# Patient Record
Sex: FEMALE | Race: OTHER | Employment: UNEMPLOYED | URBAN - METROPOLITAN AREA
[De-identification: names, ages, dates, MRNs, and addresses within clinical notes are randomized per-mention and may not be internally consistent; named-entity substitution may affect disease eponyms.]

---

## 2017-05-11 ENCOUNTER — HOSPITAL ENCOUNTER (EMERGENCY)
Age: 42
Discharge: HOME | End: 2017-05-11
Payer: MEDICARE

## 2017-05-11 VITALS
TEMPERATURE: 98.9 F | WEIGHT: 156 LBS | BODY MASS INDEX: 25.99 KG/M2 | DIASTOLIC BLOOD PRESSURE: 79 MMHG | SYSTOLIC BLOOD PRESSURE: 120 MMHG | RESPIRATION RATE: 20 BRPM | HEIGHT: 65 IN | HEART RATE: 107 BPM | OXYGEN SATURATION: 97 %

## 2017-05-11 DIAGNOSIS — M25.50 ARTHRALGIA, UNSPECIFIED JOINT: ICD-10-CM

## 2017-05-11 DIAGNOSIS — K04.7 DENTAL INFECTION: Primary | ICD-10-CM

## 2017-05-11 PROCEDURE — 6370000000 HC RX 637 (ALT 250 FOR IP): Performed by: PHYSICIAN ASSISTANT

## 2017-05-11 PROCEDURE — 6360000002 HC RX W HCPCS: Performed by: PHYSICIAN ASSISTANT

## 2017-05-11 PROCEDURE — 96372 THER/PROPH/DIAG INJ SC/IM: CPT

## 2017-05-11 PROCEDURE — 99282 EMERGENCY DEPT VISIT SF MDM: CPT

## 2017-05-11 RX ORDER — OXYCODONE HYDROCHLORIDE AND ACETAMINOPHEN 5; 325 MG/1; MG/1
1 TABLET ORAL EVERY 4 HOURS PRN
Qty: 5 TABLET | Refills: 0 | Status: SHIPPED | OUTPATIENT
Start: 2017-05-11 | End: 2017-05-18

## 2017-05-11 RX ORDER — CLINDAMYCIN HYDROCHLORIDE 150 MG/1
300 CAPSULE ORAL 4 TIMES DAILY
Qty: 28 CAPSULE | Refills: 0 | Status: SHIPPED | OUTPATIENT
Start: 2017-05-11 | End: 2017-05-18

## 2017-05-11 RX ORDER — OXYCODONE HYDROCHLORIDE AND ACETAMINOPHEN 5; 325 MG/1; MG/1
1 TABLET ORAL ONCE
Status: COMPLETED | OUTPATIENT
Start: 2017-05-11 | End: 2017-05-11

## 2017-05-11 RX ORDER — NAPROXEN 375 MG/1
TABLET ORAL
Qty: 20 TABLET | Refills: 0 | Status: SHIPPED | OUTPATIENT
Start: 2017-05-11 | End: 2019-07-31

## 2017-05-11 RX ORDER — KETOROLAC TROMETHAMINE 30 MG/ML
60 INJECTION, SOLUTION INTRAMUSCULAR; INTRAVENOUS ONCE
Status: COMPLETED | OUTPATIENT
Start: 2017-05-11 | End: 2017-05-11

## 2017-05-11 RX ORDER — CLINDAMYCIN HYDROCHLORIDE 150 MG/1
300 CAPSULE ORAL ONCE
Status: COMPLETED | OUTPATIENT
Start: 2017-05-11 | End: 2017-05-11

## 2017-05-11 RX ADMIN — OXYCODONE HYDROCHLORIDE AND ACETAMINOPHEN 1 TABLET: 5; 325 TABLET ORAL at 23:02

## 2017-05-11 RX ADMIN — KETOROLAC TROMETHAMINE 60 MG: 60 INJECTION, SOLUTION INTRAMUSCULAR at 23:02

## 2017-05-11 RX ADMIN — CLINDAMYCIN HYDROCHLORIDE 300 MG: 150 CAPSULE ORAL at 23:02

## 2017-05-11 ASSESSMENT — ENCOUNTER SYMPTOMS: RESPIRATORY NEGATIVE: 1

## 2017-05-11 ASSESSMENT — PAIN SCALES - GENERAL
PAINLEVEL_OUTOF10: 10
PAINLEVEL_OUTOF10: 10

## 2017-07-24 ENCOUNTER — HOSPITAL ENCOUNTER (EMERGENCY)
Age: 42
Discharge: HOME | End: 2017-07-24
Attending: EMERGENCY MEDICINE
Payer: MEDICARE

## 2017-07-24 VITALS
OXYGEN SATURATION: 98 % | SYSTOLIC BLOOD PRESSURE: 114 MMHG | TEMPERATURE: 98.4 F | DIASTOLIC BLOOD PRESSURE: 63 MMHG | HEART RATE: 67 BPM | RESPIRATION RATE: 16 BRPM

## 2017-07-24 DIAGNOSIS — R10.9 ABDOMINAL PAIN, UNSPECIFIED LOCATION: Primary | ICD-10-CM

## 2017-07-24 DIAGNOSIS — K52.9 GASTROENTERITIS: ICD-10-CM

## 2017-07-24 LAB
ACETAMINOPHEN LEVEL: <15 UG/ML (ref 10–30)
ALBUMIN SERPL-MCNC: 4.2 G/DL (ref 3.9–4.9)
ALP BLD-CCNC: 82 U/L (ref 40–130)
ALT SERPL-CCNC: 7 U/L (ref 0–33)
ANION GAP SERPL CALCULATED.3IONS-SCNC: 15 MEQ/L (ref 7–13)
AST SERPL-CCNC: 15 U/L (ref 0–35)
BASOPHILS ABSOLUTE: 0.1 K/UL (ref 0–0.2)
BASOPHILS RELATIVE PERCENT: 0.4 %
BILIRUB SERPL-MCNC: 0.4 MG/DL (ref 0–1.2)
BUN BLDV-MCNC: 8 MG/DL (ref 6–20)
CALCIUM SERPL-MCNC: 9.7 MG/DL (ref 8.6–10.2)
CHLORIDE BLD-SCNC: 98 MEQ/L (ref 98–107)
CO2: 23 MEQ/L (ref 22–29)
CREAT SERPL-MCNC: 0.58 MG/DL (ref 0.5–0.9)
EOSINOPHILS ABSOLUTE: 0.1 K/UL (ref 0–0.7)
EOSINOPHILS RELATIVE PERCENT: 0.7 %
ETHANOL PERCENT: NORMAL G/DL
ETHANOL: <10 MG/DL (ref 0–0.08)
GFR AFRICAN AMERICAN: >60
GFR NON-AFRICAN AMERICAN: >60
GLOBULIN: 4.8 G/DL (ref 2.3–3.5)
GLUCOSE BLD-MCNC: 108 MG/DL (ref 74–109)
HCG QUALITATIVE: NEGATIVE
HCT VFR BLD CALC: 43.9 % (ref 37–47)
HEMOGLOBIN: 14.3 G/DL (ref 12–16)
LYMPHOCYTES ABSOLUTE: 2.3 K/UL (ref 1–4.8)
LYMPHOCYTES RELATIVE PERCENT: 15.1 %
MCH RBC QN AUTO: 26 PG (ref 27–31.3)
MCHC RBC AUTO-ENTMCNC: 32.5 % (ref 33–37)
MCV RBC AUTO: 79.9 FL (ref 82–100)
MONOCYTES ABSOLUTE: 0.8 K/UL (ref 0.2–0.8)
MONOCYTES RELATIVE PERCENT: 5.1 %
NEUTROPHILS ABSOLUTE: 11.8 K/UL (ref 1.4–6.5)
NEUTROPHILS RELATIVE PERCENT: 78.7 %
PDW BLD-RTO: 15.5 % (ref 11.5–14.5)
PLATELET # BLD: 346 K/UL (ref 130–400)
POTASSIUM SERPL-SCNC: 3.2 MEQ/L (ref 3.5–5.1)
RBC # BLD: 5.5 M/UL (ref 4.2–5.4)
SALICYLATE, SERUM: <0.3 MG/DL (ref 15–30)
SODIUM BLD-SCNC: 136 MEQ/L (ref 132–144)
TOTAL PROTEIN: 9 G/DL (ref 6.4–8.1)
WBC # BLD: 15 K/UL (ref 4.8–10.8)

## 2017-07-24 PROCEDURE — 6360000002 HC RX W HCPCS: Performed by: EMERGENCY MEDICINE

## 2017-07-24 PROCEDURE — 80053 COMPREHEN METABOLIC PANEL: CPT

## 2017-07-24 PROCEDURE — 2500000003 HC RX 250 WO HCPCS: Performed by: EMERGENCY MEDICINE

## 2017-07-24 PROCEDURE — S0028 INJECTION, FAMOTIDINE, 20 MG: HCPCS | Performed by: EMERGENCY MEDICINE

## 2017-07-24 PROCEDURE — 85025 COMPLETE CBC W/AUTO DIFF WBC: CPT

## 2017-07-24 PROCEDURE — G0480 DRUG TEST DEF 1-7 CLASSES: HCPCS

## 2017-07-24 PROCEDURE — 36415 COLL VENOUS BLD VENIPUNCTURE: CPT

## 2017-07-24 PROCEDURE — 99284 EMERGENCY DEPT VISIT MOD MDM: CPT

## 2017-07-24 PROCEDURE — 84703 CHORIONIC GONADOTROPIN ASSAY: CPT

## 2017-07-24 PROCEDURE — 2580000003 HC RX 258: Performed by: EMERGENCY MEDICINE

## 2017-07-24 PROCEDURE — 96374 THER/PROPH/DIAG INJ IV PUSH: CPT

## 2017-07-24 PROCEDURE — 96375 TX/PRO/DX INJ NEW DRUG ADDON: CPT

## 2017-07-24 RX ORDER — DIPHENHYDRAMINE HYDROCHLORIDE 50 MG/ML
25 INJECTION INTRAMUSCULAR; INTRAVENOUS ONCE
Status: COMPLETED | OUTPATIENT
Start: 2017-07-24 | End: 2017-07-24

## 2017-07-24 RX ORDER — 0.9 % SODIUM CHLORIDE 0.9 %
1000 INTRAVENOUS SOLUTION INTRAVENOUS ONCE
Status: COMPLETED | OUTPATIENT
Start: 2017-07-24 | End: 2017-07-24

## 2017-07-24 RX ORDER — METOCLOPRAMIDE HYDROCHLORIDE 5 MG/ML
10 INJECTION INTRAMUSCULAR; INTRAVENOUS ONCE
Status: COMPLETED | OUTPATIENT
Start: 2017-07-24 | End: 2017-07-24

## 2017-07-24 RX ORDER — KETOROLAC TROMETHAMINE 30 MG/ML
30 INJECTION, SOLUTION INTRAMUSCULAR; INTRAVENOUS ONCE
Status: COMPLETED | OUTPATIENT
Start: 2017-07-24 | End: 2017-07-24

## 2017-07-24 RX ORDER — ONDANSETRON 4 MG/1
4 TABLET, FILM COATED ORAL EVERY 8 HOURS PRN
Qty: 6 TABLET | Refills: 0 | Status: SHIPPED | OUTPATIENT
Start: 2017-07-24 | End: 2019-07-31

## 2017-07-24 RX ADMIN — DIPHENHYDRAMINE HYDROCHLORIDE 25 MG: 50 INJECTION, SOLUTION INTRAMUSCULAR; INTRAVENOUS at 18:01

## 2017-07-24 RX ADMIN — KETOROLAC TROMETHAMINE 30 MG: 30 INJECTION, SOLUTION INTRAMUSCULAR at 17:58

## 2017-07-24 RX ADMIN — FAMOTIDINE 20 MG: 10 INJECTION INTRAVENOUS at 18:01

## 2017-07-24 RX ADMIN — SODIUM CHLORIDE 1000 ML: 9 INJECTION, SOLUTION INTRAVENOUS at 17:57

## 2017-07-24 RX ADMIN — METOCLOPRAMIDE 10 MG: 5 INJECTION, SOLUTION INTRAMUSCULAR; INTRAVENOUS at 18:00

## 2017-07-24 ASSESSMENT — ENCOUNTER SYMPTOMS
DIARRHEA: 1
COUGH: 0
ABDOMINAL PAIN: 1
VOMITING: 1
SHORTNESS OF BREATH: 0

## 2017-07-24 ASSESSMENT — PAIN SCALES - GENERAL: PAINLEVEL_OUTOF10: 10

## 2018-08-12 ENCOUNTER — APPOINTMENT (OUTPATIENT)
Dept: GENERAL RADIOLOGY | Age: 43
End: 2018-08-12
Payer: MEDICARE

## 2018-08-12 ENCOUNTER — HOSPITAL ENCOUNTER (OUTPATIENT)
Age: 43
Setting detail: OBSERVATION
Discharge: HOME | End: 2018-08-13
Attending: FAMILY MEDICINE | Admitting: INTERNAL MEDICINE
Payer: MEDICARE

## 2018-08-12 DIAGNOSIS — F14.90 COCAINE USE: ICD-10-CM

## 2018-08-12 DIAGNOSIS — T42.4X1A BENZODIAZEPINE OVERDOSE, ACCIDENTAL OR UNINTENTIONAL, INITIAL ENCOUNTER: Primary | ICD-10-CM

## 2018-08-12 DIAGNOSIS — F32.A DEPRESSION, UNSPECIFIED DEPRESSION TYPE: ICD-10-CM

## 2018-08-12 PROBLEM — F13.10 BENZODIAZEPINE ABUSE (HCC): Status: ACTIVE | Noted: 2018-08-12

## 2018-08-12 LAB
ACANTHOCYTES: 0
ALBUMIN SERPL-MCNC: 3.8 G/DL (ref 3.9–4.9)
ALP BLD-CCNC: 96 U/L (ref 40–130)
ALT SERPL-CCNC: 9 U/L (ref 0–33)
AMPHETAMINE SCREEN, URINE: ABNORMAL
ANION GAP SERPL CALCULATED.3IONS-SCNC: 15 MEQ/L (ref 7–13)
ANISOCYTOSIS: 0
AST SERPL-CCNC: 22 U/L (ref 0–35)
AUER RODS: 0
BANDED NEUTROPHILS RELATIVE PERCENT: 5 % (ref 5–11)
BARBITURATE SCREEN URINE: ABNORMAL
BASOPHILIC STIPPLING: 0
BASOPHILS ABSOLUTE: 0 K/UL (ref 0–0.2)
BASOPHILS RELATIVE PERCENT: 0 %
BENZODIAZEPINE SCREEN, URINE: POSITIVE
BILIRUB SERPL-MCNC: 0.3 MG/DL (ref 0–1.2)
BILIRUBIN URINE: NEGATIVE
BLOOD, URINE: NEGATIVE
BUN BLDV-MCNC: 14 MG/DL (ref 6–20)
BURR CELLS: 0
CABOT RINGS: 0
CALCIUM SERPL-MCNC: 8.6 MG/DL (ref 8.6–10.2)
CANNABINOID SCREEN URINE: ABNORMAL
CHLORIDE BLD-SCNC: 100 MEQ/L (ref 98–107)
CHP ED QC CHECK: YES
CLARITY: CLEAR
CO2: 20 MEQ/L (ref 22–29)
COCAINE METABOLITE SCREEN URINE: POSITIVE
COLOR: YELLOW
CREAT SERPL-MCNC: 0.73 MG/DL (ref 0.5–0.9)
DOHLE BODIES: 0
EKG ATRIAL RATE: 97 BPM
EKG P AXIS: 52 DEGREES
EKG P-R INTERVAL: 146 MS
EKG Q-T INTERVAL: 354 MS
EKG QRS DURATION: 86 MS
EKG QTC CALCULATION (BAZETT): 449 MS
EKG R AXIS: 32 DEGREES
EKG T AXIS: 32 DEGREES
EKG VENTRICULAR RATE: 97 BPM
EOSINOPHILS ABSOLUTE: 0 K/UL (ref 0–0.7)
EOSINOPHILS RELATIVE PERCENT: 0 %
ETHANOL PERCENT: NORMAL G/DL
ETHANOL: <10 MG/DL (ref 0–0.08)
GFR AFRICAN AMERICAN: >60
GFR NON-AFRICAN AMERICAN: >60
GLOBULIN: 4.4 G/DL (ref 2.3–3.5)
GLUCOSE BLD-MCNC: 107 MG/DL (ref 74–109)
GLUCOSE URINE: 250 MG/DL
HAIRY CELLS: 0
HCT VFR BLD CALC: 41.7 % (ref 37–47)
HEMOGLOBIN: 14 G/DL (ref 12–16)
HOWELL-JOLLY BODIES: 0
HYPERSEGMENTED NEUTROPHILS: 0
HYPOCHROMIA: 0
KETONES, URINE: ABNORMAL MG/DL
LEUKOCYTE ESTERASE, URINE: NEGATIVE
LYMPHOCYTES ABSOLUTE: 1.7 K/UL (ref 1–4.8)
LYMPHOCYTES RELATIVE PERCENT: 7 %
Lab: ABNORMAL
MACROCYTES: 0
MCH RBC QN AUTO: 29.4 PG (ref 27–31.3)
MCHC RBC AUTO-ENTMCNC: 33.6 % (ref 33–37)
MCV RBC AUTO: 87.4 FL (ref 82–100)
MICROCYTES: 0
MONOCYTES ABSOLUTE: 0.7 K/UL (ref 0.2–0.8)
MONOCYTES RELATIVE PERCENT: 2.7 %
NEUTROPHILS ABSOLUTE: 21.5 K/UL (ref 1.4–6.5)
NEUTROPHILS RELATIVE PERCENT: 86 %
NITRITE, URINE: NEGATIVE
OPIATE SCREEN URINE: ABNORMAL
OVALOCYTES: 0
PAPPENHEIMER BODIES: 0
PDW BLD-RTO: 13 % (ref 11.5–14.5)
PELGER HUET CELLS: 0 %
PH UA: 5 (ref 5–9)
PHENCYCLIDINE SCREEN URINE: ABNORMAL
PLATELET # BLD: 324 K/UL (ref 130–400)
PLATELET SLIDE REVIEW: ADEQUATE
POIKILOCYTES: 0
POLYCHROMASIA: 0
POTASSIUM SERPL-SCNC: 4.4 MEQ/L (ref 3.5–5.1)
PREGNANCY TEST URINE, POC: NEGATIVE
PRO-BNP: 140 PG/ML
PRO-BNP: 46 PG/ML
PROTEIN UA: ABNORMAL MG/DL
RBC # BLD: 4.78 M/UL (ref 4.2–5.4)
RBC # BLD: NORMAL 10*6/UL
SCHISTOCYTES: 0
SICKLE CELLS: 0
SMUDGE CELLS: 1.8
SODIUM BLD-SCNC: 135 MEQ/L (ref 132–144)
SPECIFIC GRAVITY UA: 1.02 (ref 1–1.03)
SPHEROCYTES: 0
STOMATOCYTES: 0
TARGET CELLS: 0
TEAR DROP CELLS: 0
TOTAL CK: 56 U/L (ref 0–170)
TOTAL PROTEIN: 8.2 G/DL (ref 6.4–8.1)
TOXIC GRANULATION: 0
TROPONIN: <0.01 NG/ML (ref 0–0.01)
TROPONIN: <0.01 NG/ML (ref 0–0.01)
URINE REFLEX TO CULTURE: ABNORMAL
UROBILINOGEN, URINE: 1 E.U./DL
VACUOLATED NEUTROPHILS: 0
WBC # BLD: 23.6 K/UL (ref 4.8–10.8)

## 2018-08-12 PROCEDURE — 2580000003 HC RX 258: Performed by: FAMILY MEDICINE

## 2018-08-12 PROCEDURE — 80053 COMPREHEN METABOLIC PANEL: CPT

## 2018-08-12 PROCEDURE — G0378 HOSPITAL OBSERVATION PER HR: HCPCS

## 2018-08-12 PROCEDURE — 83880 ASSAY OF NATRIURETIC PEPTIDE: CPT

## 2018-08-12 PROCEDURE — G0480 DRUG TEST DEF 1-7 CLASSES: HCPCS

## 2018-08-12 PROCEDURE — 36415 COLL VENOUS BLD VENIPUNCTURE: CPT

## 2018-08-12 PROCEDURE — 82550 ASSAY OF CK (CPK): CPT

## 2018-08-12 PROCEDURE — 6360000002 HC RX W HCPCS: Performed by: INTERNAL MEDICINE

## 2018-08-12 PROCEDURE — 71045 X-RAY EXAM CHEST 1 VIEW: CPT

## 2018-08-12 PROCEDURE — P9612 CATHETERIZE FOR URINE SPEC: HCPCS

## 2018-08-12 PROCEDURE — 81003 URINALYSIS AUTO W/O SCOPE: CPT

## 2018-08-12 PROCEDURE — 96375 TX/PRO/DX INJ NEW DRUG ADDON: CPT

## 2018-08-12 PROCEDURE — 6360000002 HC RX W HCPCS: Performed by: FAMILY MEDICINE

## 2018-08-12 PROCEDURE — 71046 X-RAY EXAM CHEST 2 VIEWS: CPT

## 2018-08-12 PROCEDURE — 6370000000 HC RX 637 (ALT 250 FOR IP): Performed by: INTERNAL MEDICINE

## 2018-08-12 PROCEDURE — 96366 THER/PROPH/DIAG IV INF ADDON: CPT

## 2018-08-12 PROCEDURE — 96374 THER/PROPH/DIAG INJ IV PUSH: CPT

## 2018-08-12 PROCEDURE — 93005 ELECTROCARDIOGRAM TRACING: CPT

## 2018-08-12 PROCEDURE — 2500000003 HC RX 250 WO HCPCS: Performed by: FAMILY MEDICINE

## 2018-08-12 PROCEDURE — 2580000003 HC RX 258

## 2018-08-12 PROCEDURE — 96365 THER/PROPH/DIAG IV INF INIT: CPT

## 2018-08-12 PROCEDURE — 85025 COMPLETE CBC W/AUTO DIFF WBC: CPT

## 2018-08-12 PROCEDURE — 84484 ASSAY OF TROPONIN QUANT: CPT

## 2018-08-12 PROCEDURE — 80307 DRUG TEST PRSMV CHEM ANLYZR: CPT

## 2018-08-12 PROCEDURE — 2580000003 HC RX 258: Performed by: INTERNAL MEDICINE

## 2018-08-12 PROCEDURE — 99285 EMERGENCY DEPT VISIT HI MDM: CPT

## 2018-08-12 RX ORDER — NALOXONE HYDROCHLORIDE 1 MG/ML
2 INJECTION INTRAMUSCULAR; INTRAVENOUS; SUBCUTANEOUS ONCE
Status: COMPLETED | OUTPATIENT
Start: 2018-08-12 | End: 2018-08-12

## 2018-08-12 RX ORDER — ONDANSETRON 2 MG/ML
4 INJECTION INTRAMUSCULAR; INTRAVENOUS EVERY 6 HOURS PRN
Status: DISCONTINUED | OUTPATIENT
Start: 2018-08-12 | End: 2018-08-13 | Stop reason: HOSPADM

## 2018-08-12 RX ORDER — ACETAMINOPHEN 325 MG/1
650 TABLET ORAL EVERY 4 HOURS PRN
Status: DISCONTINUED | OUTPATIENT
Start: 2018-08-12 | End: 2018-08-13 | Stop reason: HOSPADM

## 2018-08-12 RX ORDER — NICOTINE 21 MG/24HR
1 PATCH, TRANSDERMAL 24 HOURS TRANSDERMAL DAILY
Status: DISCONTINUED | OUTPATIENT
Start: 2018-08-12 | End: 2018-08-13 | Stop reason: HOSPADM

## 2018-08-12 RX ORDER — BUSPIRONE HYDROCHLORIDE 5 MG/1
5 TABLET ORAL 2 TIMES DAILY
COMMUNITY
End: 2019-07-31

## 2018-08-12 RX ORDER — ALPRAZOLAM 0.25 MG/1
0.25 TABLET ORAL 3 TIMES DAILY PRN
COMMUNITY

## 2018-08-12 RX ORDER — CLONAZEPAM 0.5 MG/1
0.5 TABLET ORAL 3 TIMES DAILY PRN
COMMUNITY

## 2018-08-12 RX ORDER — SODIUM CHLORIDE 0.9 % (FLUSH) 0.9 %
10 SYRINGE (ML) INJECTION EVERY 12 HOURS SCHEDULED
Status: DISCONTINUED | OUTPATIENT
Start: 2018-08-12 | End: 2018-08-13 | Stop reason: HOSPADM

## 2018-08-12 RX ORDER — KETOROLAC TROMETHAMINE 15 MG/ML
15 INJECTION, SOLUTION INTRAMUSCULAR; INTRAVENOUS ONCE
Status: COMPLETED | OUTPATIENT
Start: 2018-08-12 | End: 2018-08-12

## 2018-08-12 RX ORDER — SODIUM CHLORIDE 9 MG/ML
INJECTION, SOLUTION INTRAVENOUS
Status: COMPLETED
Start: 2018-08-12 | End: 2018-08-12

## 2018-08-12 RX ORDER — NALOXONE HYDROCHLORIDE 1 MG/ML
INJECTION INTRAMUSCULAR; INTRAVENOUS; SUBCUTANEOUS
Status: DISPENSED
Start: 2018-08-12 | End: 2018-08-12

## 2018-08-12 RX ORDER — SODIUM CHLORIDE 9 MG/ML
INJECTION, SOLUTION INTRAVENOUS CONTINUOUS
Status: DISCONTINUED | OUTPATIENT
Start: 2018-08-12 | End: 2018-08-13 | Stop reason: HOSPADM

## 2018-08-12 RX ORDER — SODIUM CHLORIDE 0.9 % (FLUSH) 0.9 %
10 SYRINGE (ML) INJECTION PRN
Status: DISCONTINUED | OUTPATIENT
Start: 2018-08-12 | End: 2018-08-13 | Stop reason: HOSPADM

## 2018-08-12 RX ORDER — LANOLIN ALCOHOL/MO/W.PET/CERES
1000 CREAM (GRAM) TOPICAL DAILY
COMMUNITY
End: 2019-07-31

## 2018-08-12 RX ADMIN — SODIUM CHLORIDE 1000 ML: 9 INJECTION, SOLUTION INTRAVENOUS at 15:31

## 2018-08-12 RX ADMIN — NALOXONE HYDROCHLORIDE 2 MG: 1 INJECTION PARENTERAL at 11:28

## 2018-08-12 RX ADMIN — FOLIC ACID: 5 INJECTION, SOLUTION INTRAMUSCULAR; INTRAVENOUS; SUBCUTANEOUS at 13:59

## 2018-08-12 RX ADMIN — Medication 10 ML: at 21:45

## 2018-08-12 RX ADMIN — KETOROLAC TROMETHAMINE 15 MG: 15 INJECTION, SOLUTION INTRAMUSCULAR; INTRAVENOUS at 22:56

## 2018-08-12 RX ADMIN — SODIUM CHLORIDE: 9 INJECTION, SOLUTION INTRAVENOUS at 21:45

## 2018-08-12 ASSESSMENT — ENCOUNTER SYMPTOMS
RESPIRATORY NEGATIVE: 1
EYES NEGATIVE: 1
ALLERGIC/IMMUNOLOGIC NEGATIVE: 1

## 2018-08-12 ASSESSMENT — PAIN SCALES - GENERAL: PAINLEVEL_OUTOF10: 8

## 2018-08-12 NOTE — ED NOTES
Pt returned from xray , lethargic but arouses to name. Unable to answer abuse screening questions at this time.      Gray Rios RN  08/12/18 2358

## 2018-08-12 NOTE — ED NOTES
Patient blood pressure becomes hypotensive when the patient falls asleep. Dr. Lowe Loi aware.       Rosanne Cohen RN  08/12/18 5659

## 2018-08-12 NOTE — ED PROVIDER NOTES
1980 On license of [facility]  eMERGENCY dEPARTMENT eNCOUnter      Pt Name: Noble Figueroa  MRN: [id number]  [place] 1975  Date of evaluation: 8/12/2018  Provider: Rigoberto Gage MD    CHIEF COMPLAINT       Chief Complaint   Patient presents with    Drug Overdose     unknown, per ems heroin. patient denies using illegal drugs. 2mg IV narcan given upon arrival due to patient only responding to painful stimuli. 4mg nasal given by [facility] and 4mg IV by [facility]. HISTORY OF PRESENT ILLNESS   (Location/Symptom, Timing/Onset, Context/Setting, Quality, Duration, Modifying Factors, Severity)  Note limiting factors. Noble Figueroa is a 43 y.o. female who presents to the emergency department   Drug over dose    History of Present Illness    Patient Identification  Noble Figueroa is a 43 y.o. female. Patient information was obtained from patient, EMS personnel and caregiver / friend. History/Exam limitations: mental status. Patient presented voluntarily to the Emergency Department by ambulance where the patient received Narcan 4 mg nasal and 4 IV prior to arrival     Chief Complaint   Drug Overdose (unknown, per ems heroin. patient denies using illegal drugs. 2mg IV narcan given upon arrival due to patient only responding to painful stimuli. 4mg nasal given by [facility] and 4mg IV by [facility]. )      Patient presents for psychiatric evaluation and requires medical clearance exam. Patient is brought by son. She is not placed on [facility] [address] and is not requiring physical restraint. Patient requires psychiatric evaluation with concern for depression and substance abuse. She has associated symptoms including altered mental status which began about a few hours ago. Patient complains of depression. Onset of symptoms was years and is currently seen by psychiatrist      Past Medical History:  No date: Asthma  No date: Hepatitis C  No family history on file. The history is provided by the patient.        Nursing Notes were reviewed. REVIEW OF SYSTEMS    (2-9 systems for level 4, 10 or more for level 5)     Review of Systems   Constitutional: Positive for activity change and appetite change. HENT: Negative. Eyes: Negative. Respiratory: Negative. Cardiovascular: Negative. Endocrine: Negative. Genitourinary: Negative. Musculoskeletal: Negative. Allergic/Immunologic: Negative. Neurological: Negative. Hematological: Negative. Psychiatric/Behavioral: Positive for decreased concentration. Negative for agitation, behavioral problems, confusion, dysphoric mood, hallucinations, self-injury, sleep disturbance and suicidal ideas. The patient is not nervous/anxious and is not hyperactive. Except as noted above the remainder of the review of systems was reviewed and negative. PAST MEDICAL HISTORY     Past Medical History:   Diagnosis Date    Asthma     Hepatitis C          SURGICAL HISTORY       Past Surgical History:   Procedure Laterality Date    HYSTERECTOMY      TUBAL LIGATION           CURRENT MEDICATIONS       Current Discharge Medication List      CONTINUE these medications which have NOT CHANGED    Details   clonazePAM (KLONOPIN) 0.5 MG tablet Take 0.5 mg by mouth 3 times daily as needed. .      ALPRAZolam (XANAX) 0.25 MG tablet Take 0.25 mg by mouth 3 times daily as needed for Anxiety. .      busPIRone (BUSPAR) 5 MG tablet Take 5 mg by mouth 2 times daily      ondansetron (ZOFRAN) 4 MG tablet Take 1 tablet by mouth every 8 hours as needed for Nausea or Vomiting  Qty: 6 tablet, Refills: 0      naproxen (NAPROSYN) 375 MG tablet 1 tablet every 12 hours for pain  Qty: 20 tablet, Refills: 0      Ergocalciferol (VITAMIN D2 PO) Take 50,000 Units by mouth 3x/ week. Monday, Wed., and  Fridays for 8 weeks. buprenorphine-naloxone (SUBOXONE) 2-0.5 MG SUBL Place 8 mg under the tongue daily.         vitamin B-12 (CYANOCOBALAMIN) 1000 MCG tablet Take 1,000 mcg by mouth daily      hyoscyamine She displays a negative Romberg sign. GCS eye subscore is 4. GCS verbal subscore is 5. GCS motor subscore is 6. Reflex Scores:       Tricep reflexes are 2+ on the right side and 2+ on the left side. Bicep reflexes are 2+ on the right side and 2+ on the left side. Brachioradialis reflexes are 2+ on the right side and 2+ on the left side. Patellar reflexes are 2+ on the right side and 2+ on the left side. Achilles reflexes are 2+ on the right side and 2+ on the left side. Skin: Skin is warm and dry. Psychiatric: She has a normal mood and affect. Her behavior is normal. Judgment and thought content normal.   Nursing note and vitals reviewed. DIAGNOSTIC RESULTS     EKG: All EKG's are interpreted by the Emergency Department Physician who either signs or Co-signs this chart in the absence of a cardiologist.    EKG: normal EKG, normal sinus rhythm, Rate 97. RADIOLOGY:   Non-plain film images such as CT, Ultrasound and MRI are read by the radiologist. Plain radiographic images are visualized and preliminarily interpreted by the emergency physician with the below findings:         Interpretation per the Radiologist below, if available at the time of this note:    XR CHEST (SINGLE VIEW FRONTAL)   Final Result   NO ACUTE CARDIOPULMONARY DISEASE.             ED BEDSIDE ULTRASOUND:   Performed by ED Physician - none    LABS:  Labs Reviewed   CBC WITH AUTO DIFFERENTIAL - Abnormal; Notable for the following:        Result Value    WBC 23.6 (*)     Neutrophils # 21.5 (*)     All other components within normal limits   COMPREHENSIVE METABOLIC PANEL - Abnormal; Notable for the following:     CO2 20 (*)     Anion Gap 15 (*)     Total Protein 8.2 (*)     Alb 3.8 (*)     Globulin 4.4 (*)     All other components within normal limits   URINE DRUG SCREEN - Abnormal; Notable for the following:     Benzodiazepine Screen, Urine POSITIVE (*)     Cocaine Metabolite Screen, Urine POSITIVE (*)     All other components within normal limits   URINE RT REFLEX TO CULTURE - Abnormal; Notable for the following:     Glucose, Ur 250 (*)     Ketones, Urine TRACE (*)     Protein, UA TRACE (*)     All other components within normal limits   POCT URINE PREGNANCY - Normal   ETHANOL   TROPONIN   BRAIN NATRIURETIC PEPTIDE   BRAIN NATRIURETIC PEPTIDE   TROPONIN   CK   BASIC METABOLIC PANEL W/ REFLEX TO MG FOR LOW K    CBC WITH AUTO DIFFERENTIAL       All other labs were within normal range or not returned as of this dictation. EMERGENCY DEPARTMENT COURSE and DIFFERENTIAL DIAGNOSIS/MDM:   Vitals:    Vitals:    08/12/18 1729 08/12/18 1740 08/12/18 1810 08/12/18 1848   BP: (!) 74/47 99/81 99/60 104/68   Pulse: 77 83 81 80   Resp: 20 18 18 18   Temp:    98.2 °F (36.8 °C)   TempSrc:    Oral   SpO2: 100% 99% 98% 99%   Weight:       Height:              MDM  Number of Diagnoses or Management Options  Benzodiazepine overdose, accidental or unintentional, initial encounter:   Cocaine use:   Depression, unspecified depression type:      Amount and/or Complexity of Data Reviewed  Clinical lab tests: ordered and reviewed  Tests in the radiology section of CPT®: ordered and reviewed        CONSULTS:  None    PROCEDURES:  Unless otherwise noted below, none     Procedures    FINAL IMPRESSION      1. Benzodiazepine overdose, accidental or unintentional, initial encounter    2. Cocaine use    3. Depression, unspecified depression type          DISPOSITION/PLAN   DISPOSITION Admitted 08/12/2018 05:47:32 PM      PATIENT REFERRED TO:  No follow-up provider specified.     DISCHARGE MEDICATIONS:  Current Discharge Medication List             (Please note that portions of this note were completed with a voice recognition program.  Efforts were made to edit the dictations but occasionally words are mis-transcribed.)    Rocio Coker MD (electronically signed)  Attending Emergency Physician          Sanchez Mercer MD  08/12/18 0295

## 2018-08-12 NOTE — ED NOTES
Transport here to  at this time. No distress noted from patient at this time. Patient continues to be easily arousable and able to carry out a conversation. Patient continues to eat doritos and drink gatorade. Belongings with patient at this time.       Olimpia Salas RN  08/12/18 8158

## 2018-08-12 NOTE — ED NOTES
Bed: 21  Expected date: 8/12/18  Expected time: 11:09 AM  Means of arrival: [facility]  Comments:  39 F - OD. Was found unresponsive, given Narcan 4mg nasally and 4mg IV. Uncooperative now.       Bakari Can RN  08/12/18 0376

## 2018-08-12 NOTE — ED TRIAGE NOTES
Pt lethargic when dropped off by [facility] ambulance with IV in left arm .  Opens eyes at times and able to tell name

## 2018-08-12 NOTE — ED NOTES
assessment completed results shared with physician. Pt states she came to [facility] today from [place] because her son from [facility]  called her and told her that he had a supprise for her she was brought to [place] and picked up a car from her son . She then went to a friends house where she admits to smoking  some crack cocaine on top of her Rx medications (Xanax)  She went driving around with her friend that she states was drinking when they saw  a friend of her friend who was in the car with her and they gave her a ride to her boyfriends house . Once in the car  Her friend's friend pull out a crack pipe to smoke pt denies smoking any at this time because she had already smoked some earlier after dropping her off at her boyfriends house she brought out a small package of heroin which she states she refuse then she and her friend drove to the park and that the last she remembers  Pt states she is followed by Psych services in [place] and that is who Rx her the Xanax  . Pt states she has an appointment 8/14/18 with her counselor in [address] [place] [address] states she want's to go home refused [facility] referral stating\" I don't need detox \"       Brad Solano RN  08/12/18 1726       [facility]  08/12/18 1724

## 2018-08-12 NOTE — H&P
Hospital Medicine  History and Physical    Patient:  Karen Leone  MRN: [id number]    CHIEF COMPLAINT:    Chief Complaint   Patient presents with    Drug Overdose     unknown, per ems heroin. patient denies using illegal drugs. 2mg IV narcan given upon arrival due to patient only responding to painful stimuli. 4mg nasal given by [facility] and 4mg IV by [facility]. History Obtained From:  Patient, EMR  Primary Care Physician: No primary care provider on file. HISTORY OF PRESENT ILLNESS:   The patient is a 43 y.o. female with PMH of illicit substance abuse and depression who presented with the above CC. Patient was found unresponsive by Jossie HOOVER, was given Narcan on the field prior to arrival to ED where patient was still pretty lethargic and another dose of narcan was given with no significant improvement, drug screen was positive for BZD and cocaine,patient was also hypotensive with SBP in the 80s, IVFs were administered,her mental status gradually improved, she was cleared by psychiatry service, but due to her persistent low Bps was referred to the hospital for further observation. Past Medical History:      Diagnosis Date    Asthma     Hepatitis C        Past Surgical History:      Procedure Laterality Date    HYSTERECTOMY      TUBAL LIGATION         Medications Prior to Admission:    Prior to Admission medications    Medication Sig Start Date End Date Taking? Authorizing Provider   hyoscyamine (LEVSIN/SL) 125 MCG sublingual tablet Place 1 tablet under the tongue every 6 hours as needed for Cramping 7/24/17 7/26/17  Ayah Childs,    ondansetron (ZOFRAN) 4 MG tablet Take 1 tablet by mouth every 8 hours as needed for Nausea or Vomiting 7/24/17   Ayah Childs,    naproxen (NAPROSYN) 375 MG tablet 1 tablet every 12 hours for pain 5/11/17   Jose Alejandro Salter PA-C   Ergocalciferol (VITAMIN D2 PO) 50,000 Units.  9/27/14   Historical Provider, MD   buprenorphine-naloxone (SUBOXONE) 2-0.5 MG SUBL Place 8

## 2018-08-12 NOTE — ED NOTES
Spoke with Marcelo from lab. Asked for phlebotomist to be sent to draw a CK level.       Alma Rosa Guy RN  08/12/18 9874

## 2018-08-13 VITALS
TEMPERATURE: 97.9 F | BODY MASS INDEX: 29.99 KG/M2 | HEIGHT: 65 IN | RESPIRATION RATE: 19 BRPM | DIASTOLIC BLOOD PRESSURE: 87 MMHG | HEART RATE: 87 BPM | SYSTOLIC BLOOD PRESSURE: 122 MMHG | WEIGHT: 180 LBS | OXYGEN SATURATION: 96 %

## 2018-08-13 LAB
ANION GAP SERPL CALCULATED.3IONS-SCNC: 12 MEQ/L (ref 7–13)
BASOPHILS ABSOLUTE: 0 K/UL (ref 0–0.2)
BASOPHILS RELATIVE PERCENT: 0.4 %
BUN BLDV-MCNC: 12 MG/DL (ref 6–20)
CALCIUM SERPL-MCNC: 7.8 MG/DL (ref 8.6–10.2)
CHLORIDE BLD-SCNC: 108 MEQ/L (ref 98–107)
CO2: 19 MEQ/L (ref 22–29)
CREAT SERPL-MCNC: 0.62 MG/DL (ref 0.5–0.9)
EOSINOPHILS ABSOLUTE: 0 K/UL (ref 0–0.7)
EOSINOPHILS RELATIVE PERCENT: 0.6 %
GFR AFRICAN AMERICAN: >60
GFR NON-AFRICAN AMERICAN: >60
GLUCOSE BLD-MCNC: 75 MG/DL (ref 74–109)
HCT VFR BLD CALC: 33 % (ref 37–47)
HEMOGLOBIN: 11.1 G/DL (ref 12–16)
LYMPHOCYTES ABSOLUTE: 1.5 K/UL (ref 1–4.8)
LYMPHOCYTES RELATIVE PERCENT: 20.8 %
MCH RBC QN AUTO: 29.7 PG (ref 27–31.3)
MCHC RBC AUTO-ENTMCNC: 33.8 % (ref 33–37)
MCV RBC AUTO: 87.9 FL (ref 82–100)
MONOCYTES ABSOLUTE: 0.5 K/UL (ref 0.2–0.8)
MONOCYTES RELATIVE PERCENT: 7.7 %
NEUTROPHILS ABSOLUTE: 5 K/UL (ref 1.4–6.5)
NEUTROPHILS RELATIVE PERCENT: 70.5 %
PDW BLD-RTO: 12.9 % (ref 11.5–14.5)
PLATELET # BLD: 187 K/UL (ref 130–400)
POTASSIUM REFLEX MAGNESIUM: 3.8 MEQ/L (ref 3.5–5.1)
RBC # BLD: 3.75 M/UL (ref 4.2–5.4)
SODIUM BLD-SCNC: 139 MEQ/L (ref 132–144)
WBC # BLD: 7 K/UL (ref 4.8–10.8)

## 2018-08-13 PROCEDURE — 96372 THER/PROPH/DIAG INJ SC/IM: CPT

## 2018-08-13 PROCEDURE — 85025 COMPLETE CBC W/AUTO DIFF WBC: CPT

## 2018-08-13 PROCEDURE — 6360000002 HC RX W HCPCS: Performed by: INTERNAL MEDICINE

## 2018-08-13 PROCEDURE — 36415 COLL VENOUS BLD VENIPUNCTURE: CPT

## 2018-08-13 PROCEDURE — 80048 BASIC METABOLIC PNL TOTAL CA: CPT

## 2018-08-13 PROCEDURE — 6370000000 HC RX 637 (ALT 250 FOR IP): Performed by: INTERNAL MEDICINE

## 2018-08-13 PROCEDURE — G0378 HOSPITAL OBSERVATION PER HR: HCPCS

## 2018-08-13 RX ADMIN — ENOXAPARIN SODIUM 40 MG: 40 INJECTION SUBCUTANEOUS at 11:20

## 2018-08-13 ASSESSMENT — PAIN SCALES - GENERAL: PAINLEVEL_OUTOF10: 0

## 2018-08-13 NOTE — DISCHARGE SUMMARY
tablet  Commonly known as:  KLONOPIN     hyoscyamine 125 MCG sublingual tablet  Commonly known as:  LEVSIN/SL  Place 1 tablet under the tongue every 6 hours as needed for Cramping     naproxen 375 MG tablet  Commonly known as:  NAPROSYN  1 tablet every 12 hours for pain     ondansetron 4 MG tablet  Commonly known as:  ZOFRAN  Take 1 tablet by mouth every 8 hours as needed for Nausea or Vomiting     vitamin B-12 1000 MCG tablet  Commonly known as:  CYANOCOBALAMIN     VITAMIN D2 PO              Patient Instructions:   Current Discharge Medication List      CONTINUE these medications which have NOT CHANGED    Details   clonazePAM (KLONOPIN) 0.5 MG tablet Take 0.5 mg by mouth 3 times daily as needed. .      ALPRAZolam (XANAX) 0.25 MG tablet Take 0.25 mg by mouth 3 times daily as needed for Anxiety. .      busPIRone (BUSPAR) 5 MG tablet Take 5 mg by mouth 2 times daily      ondansetron (ZOFRAN) 4 MG tablet Take 1 tablet by mouth every 8 hours as needed for Nausea or Vomiting  Qty: 6 tablet, Refills: 0      naproxen (NAPROSYN) 375 MG tablet 1 tablet every 12 hours for pain  Qty: 20 tablet, Refills: 0      Ergocalciferol (VITAMIN D2 PO) Take 50,000 Units by mouth 3x/ week. Monday, Wed., and  Fridays for 8 weeks. buprenorphine-naloxone (SUBOXONE) 2-0.5 MG SUBL Place 8 mg under the tongue daily.         vitamin B-12 (CYANOCOBALAMIN) 1000 MCG tablet Take 1,000 mcg by mouth daily      hyoscyamine (LEVSIN/SL) 125 MCG sublingual tablet Place 1 tablet under the tongue every 6 hours as needed for Cramping  Qty: 8 tablet, Refills: 0           Activity: activity as tolerated  Diet: regular diet and diabetic diet ( if indicated)    I spent more than 30 minutes talking to the patient, family and the nurse and preparing the discharge      Signed:  Eldon Cano MD  Pager : [phone]  8/13/2018  3:12 PM

## 2018-08-13 NOTE — FLOWSHEET NOTE
Pt complain of migraine and medicated per [facility] after Dr Lyle Villarreal was notified.  Electronically signed by Triston Oliveira RN on 8/12/2018 at 11:16 PM

## 2018-10-05 ENCOUNTER — APPOINTMENT (OUTPATIENT)
Dept: GENERAL RADIOLOGY | Age: 43
End: 2018-10-05
Payer: MEDICARE

## 2018-10-05 ENCOUNTER — HOSPITAL ENCOUNTER (EMERGENCY)
Age: 43
Discharge: HOME | End: 2018-10-06
Attending: EMERGENCY MEDICINE
Payer: MEDICARE

## 2018-10-05 DIAGNOSIS — J20.9 ACUTE BRONCHITIS, UNSPECIFIED ORGANISM: ICD-10-CM

## 2018-10-05 DIAGNOSIS — T40.601A NARCOTIC OVERDOSE, ACCIDENTAL OR UNINTENTIONAL, INITIAL ENCOUNTER (HCC): Primary | ICD-10-CM

## 2018-10-05 PROCEDURE — 99284 EMERGENCY DEPT VISIT MOD MDM: CPT

## 2018-10-05 PROCEDURE — 6360000002 HC RX W HCPCS: Performed by: EMERGENCY MEDICINE

## 2018-10-05 PROCEDURE — 96365 THER/PROPH/DIAG IV INF INIT: CPT

## 2018-10-05 PROCEDURE — 2580000003 HC RX 258: Performed by: EMERGENCY MEDICINE

## 2018-10-05 PROCEDURE — 71045 X-RAY EXAM CHEST 1 VIEW: CPT

## 2018-10-05 PROCEDURE — 96372 THER/PROPH/DIAG INJ SC/IM: CPT

## 2018-10-05 RX ORDER — NALOXONE HYDROCHLORIDE 1 MG/ML
3 INJECTION INTRAMUSCULAR; INTRAVENOUS; SUBCUTANEOUS ONCE
Status: COMPLETED | OUTPATIENT
Start: 2018-10-05 | End: 2018-10-05

## 2018-10-05 RX ORDER — AMOXICILLIN AND CLAVULANATE POTASSIUM 875; 125 MG/1; MG/1
1 TABLET, FILM COATED ORAL 2 TIMES DAILY
Qty: 20 TABLET | Refills: 0 | Status: SHIPPED | OUTPATIENT
Start: 2018-10-05 | End: 2018-10-15

## 2018-10-05 RX ORDER — ONDANSETRON 4 MG/1
4 TABLET, ORALLY DISINTEGRATING ORAL ONCE
Status: COMPLETED | OUTPATIENT
Start: 2018-10-05 | End: 2018-10-05

## 2018-10-05 RX ADMIN — NALOXONE HYDROCHLORIDE 3 MG: 1 INJECTION PARENTERAL at 22:31

## 2018-10-05 RX ADMIN — PIPERACILLIN SODIUM,TAZOBACTAM SODIUM 3.38 G: 3; .375 INJECTION, POWDER, FOR SOLUTION INTRAVENOUS at 23:00

## 2018-10-05 RX ADMIN — ONDANSETRON 4 MG: 4 TABLET, ORALLY DISINTEGRATING ORAL at 22:31

## 2018-10-06 VITALS
RESPIRATION RATE: 14 BRPM | OXYGEN SATURATION: 97 % | TEMPERATURE: 97.5 F | HEART RATE: 81 BPM | DIASTOLIC BLOOD PRESSURE: 67 MMHG | BODY MASS INDEX: 29.99 KG/M2 | WEIGHT: 180 LBS | SYSTOLIC BLOOD PRESSURE: 96 MMHG | HEIGHT: 65 IN

## 2018-10-06 NOTE — ED PROVIDER NOTES
physician with the below findings:      Patient will return for further problems, further assess did not have homicidal or suicidal ideation      Interpretation per the Radiologist below, if available at the time of this note:    XR CHEST PORTABLE    (Results Pending)         ED BEDSIDE ULTRASOUND:   Performed by ED Physician - none    LABS:  Labs Reviewed - No data to display    All other labs were within normal range or not returned as of this dictation. EMERGENCY DEPARTMENT COURSE and DIFFERENTIAL DIAGNOSIS/MDM:   Vitals:    Vitals:    10/05/18 2205   BP: (!) 132/91   Pulse: 85   Resp: 16   Temp: 97.5 °F (36.4 °C)   TempSrc: Oral   SpO2: 100%   Weight: 180 lb (81.6 kg)   Height: 5' 5\" (1.651 m)           MDM    CRITICAL CARE TIME   Total Critical Care time was  minutes, excluding separately reportable procedures. There was a high probability of clinically significant/life threatening deterioration in the patient's condition which required my urgent intervention. CONSULTS:  None    PROCEDURES:  Unless otherwise noted below, none     Procedures    FINAL IMPRESSION      1. Narcotic overdose, accidental or unintentional, initial encounter ([facility] [address]    2.  Acute bronchitis, unspecified organism          DISPOSITION/PLAN   DISPOSITION Decision To Discharge 10/05/2018 10:43:07 PM      PATIENT REFERRED TO:  Primary doctor 2-4 days            DISCHARGE MEDICATIONS:  New Prescriptions    AMOXICILLIN-CLAVULANATE (AUGMENTIN) 875-125 MG PER TABLET    Take 1 tablet by mouth 2 times daily for 10 days          (Please note that portions of this note were completed with a voice recognition program.  Efforts were made to edit the dictations but occasionally words are mis-transcribed.)    Anusha Palomares MD (electronically signed)  Attending Emergency Physician          Anusha Palomares MD  10/05/18 8107       Anusha Palomares MD  10/05/18 5886

## 2018-10-06 NOTE — ED NOTES
Patient advised her son did not answer and has not called back, patient states she does not have any other family member or friend to call for ride home, patient too drowsy to send in cab, charge nurse notified     Estephania Arevalo RN  10/06/18 0001

## 2018-10-06 NOTE — ED NOTES
Bed: 09  Expected date: 10/5/18  Expected time: 9:55 PM  Means of arrival: [facility]  Comments:  [address] [facility]  10/05/18 2053

## 2018-11-28 ENCOUNTER — HOSPITAL ENCOUNTER (EMERGENCY)
Age: 43
Discharge: HOME | End: 2018-11-28
Payer: MEDICARE

## 2018-11-28 VITALS
TEMPERATURE: 97.8 F | HEART RATE: 78 BPM | WEIGHT: 173 LBS | SYSTOLIC BLOOD PRESSURE: 138 MMHG | OXYGEN SATURATION: 98 % | BODY MASS INDEX: 27.8 KG/M2 | HEIGHT: 66 IN | RESPIRATION RATE: 14 BRPM | DIASTOLIC BLOOD PRESSURE: 91 MMHG

## 2018-11-28 DIAGNOSIS — M06.9 RHEUMATOID ARTHRITIS INVOLVING LEFT WRIST, UNSPECIFIED RHEUMATOID FACTOR PRESENCE: Primary | ICD-10-CM

## 2018-11-28 PROCEDURE — 99282 EMERGENCY DEPT VISIT SF MDM: CPT

## 2018-11-28 PROCEDURE — 6360000002 HC RX W HCPCS: Performed by: NURSE PRACTITIONER

## 2018-11-28 PROCEDURE — 96372 THER/PROPH/DIAG INJ SC/IM: CPT

## 2018-11-28 RX ORDER — METHYLPREDNISOLONE ACETATE 80 MG/ML
80 INJECTION, SUSPENSION INTRA-ARTICULAR; INTRALESIONAL; INTRAMUSCULAR; SOFT TISSUE ONCE
Status: COMPLETED | OUTPATIENT
Start: 2018-11-28 | End: 2018-11-28

## 2018-11-28 RX ORDER — PREDNISONE 20 MG/1
TABLET ORAL
Qty: 18 TABLET | Refills: 0 | Status: SHIPPED | OUTPATIENT
Start: 2018-11-28 | End: 2018-12-08

## 2018-11-28 RX ADMIN — METHYLPREDNISOLONE ACETATE 80 MG: 80 INJECTION, SUSPENSION INTRA-ARTICULAR; INTRALESIONAL; INTRAMUSCULAR; SOFT TISSUE at 13:06

## 2018-11-28 ASSESSMENT — PAIN DESCRIPTION - ORIENTATION: ORIENTATION: LEFT

## 2018-11-28 ASSESSMENT — ENCOUNTER SYMPTOMS
ABDOMINAL PAIN: 0
BACK PAIN: 0
SHORTNESS OF BREATH: 0
COUGH: 0

## 2018-11-28 ASSESSMENT — PAIN DESCRIPTION - PROGRESSION: CLINICAL_PROGRESSION: GRADUALLY WORSENING

## 2018-11-28 ASSESSMENT — PAIN DESCRIPTION - FREQUENCY: FREQUENCY: CONTINUOUS

## 2018-11-28 ASSESSMENT — PAIN DESCRIPTION - PAIN TYPE: TYPE: ACUTE PAIN

## 2018-11-28 ASSESSMENT — PAIN SCALES - GENERAL: PAINLEVEL_OUTOF10: 10

## 2018-11-28 ASSESSMENT — PAIN DESCRIPTION - ONSET: ONSET: ON-GOING

## 2018-11-28 ASSESSMENT — PAIN DESCRIPTION - LOCATION: LOCATION: WRIST

## 2018-11-28 ASSESSMENT — PAIN DESCRIPTION - DESCRIPTORS: DESCRIPTORS: SHARP

## 2018-11-28 NOTE — ED TRIAGE NOTES
Pt arrived with ace wrap to left foream. Pt moaning when she moves the arm. Edema to fingers, hand and thumb. Palpable radial pulse on left. Pt awake alert oriented x 3 ambulatory.

## 2018-11-28 NOTE — ED PROVIDER NOTES
[address] [facility] ED  eMERGENCY dEPARTMENT eNCOUnter      Pt Name: Britta Palacios  MRN: [id number]  [place] 1975  Date of evaluation: 11/28/2018  Provider: REJI Mendenhall CNP      HISTORY OF PRESENT ILLNESS    Britta Palacios is a 37 y.o. female who presents to the Emergency Department with L wrist pain x 4 days. She has a history of RA and has been off of her medication for a few months. She denies any injury or trauma. REVIEW OF SYSTEMS       Review of Systems   Constitutional: Negative for activity change, appetite change and fever. HENT: Negative for congestion. Respiratory: Negative for cough and shortness of breath. Cardiovascular: Negative for chest pain. Gastrointestinal: Negative for abdominal pain. Genitourinary: Negative for dysuria. Musculoskeletal: Negative for arthralgias and back pain. L wrist pain   Skin: Negative for rash. All other systems reviewed and are negative. PAST MEDICAL HISTORY     Past Medical History:   Diagnosis Date    Asthma     Hepatitis C          SURGICAL HISTORY       Past Surgical History:   Procedure Laterality Date    HYSTERECTOMY      TUBAL LIGATION           CURRENT MEDICATIONS       Previous Medications    ALPRAZOLAM (XANAX) 0.25 MG TABLET    Take 0.25 mg by mouth 3 times daily as needed for Anxiety. Abbey Lyons BUPRENORPHINE-NALOXONE (SUBOXONE) 2-0.5 MG SUBL    Place 8 mg under the tongue daily. BUSPIRONE (BUSPAR) 5 MG TABLET    Take 5 mg by mouth 2 times daily    CLONAZEPAM (KLONOPIN) 0.5 MG TABLET    Take 0.5 mg by mouth 3 times daily as needed. .    ERGOCALCIFEROL (VITAMIN D2 PO)    Take 50,000 Units by mouth 3x/ week. Monday, Wed., and  Fridays for 8 weeks.     NAPROXEN (NAPROSYN) 375 MG TABLET    1 tablet every 12 hours for pain    ONDANSETRON (ZOFRAN) 4 MG TABLET    Take 1 tablet by mouth every 8 hours as needed for Nausea or Vomiting    VITAMIN B-12 (CYANOCOBALAMIN) 1000 MCG TABLET    Take 1,000 mcg by mouth daily

## 2018-12-19 ENCOUNTER — HOSPITAL ENCOUNTER (OUTPATIENT)
Dept: NON INVASIVE DIAGNOSTICS | Age: 43
Discharge: HOME | End: 2018-12-19
Payer: MEDICARE

## 2018-12-19 LAB
EKG ATRIAL RATE: 81 BPM
EKG P AXIS: 66 DEGREES
EKG P-R INTERVAL: 150 MS
EKG Q-T INTERVAL: 366 MS
EKG QRS DURATION: 92 MS
EKG QTC CALCULATION (BAZETT): 425 MS
EKG R AXIS: 50 DEGREES
EKG T AXIS: 43 DEGREES
EKG VENTRICULAR RATE: 81 BPM

## 2018-12-19 PROCEDURE — 93005 ELECTROCARDIOGRAM TRACING: CPT

## 2018-12-21 PROCEDURE — 93010 ELECTROCARDIOGRAM REPORT: CPT | Performed by: INTERNAL MEDICINE

## 2019-01-10 ENCOUNTER — HOSPITAL ENCOUNTER (EMERGENCY)
Age: 44
Discharge: HOME | End: 2019-01-10
Attending: EMERGENCY MEDICINE
Payer: MEDICARE

## 2019-01-10 VITALS
RESPIRATION RATE: 14 BRPM | HEART RATE: 85 BPM | OXYGEN SATURATION: 96 % | SYSTOLIC BLOOD PRESSURE: 119 MMHG | WEIGHT: 174 LBS | TEMPERATURE: 97.1 F | DIASTOLIC BLOOD PRESSURE: 81 MMHG | BODY MASS INDEX: 27.97 KG/M2 | HEIGHT: 66 IN

## 2019-01-10 DIAGNOSIS — T40.601A OPIATE OVERDOSE, ACCIDENTAL OR UNINTENTIONAL, INITIAL ENCOUNTER (HCC): Primary | ICD-10-CM

## 2019-01-10 PROCEDURE — 6360000002 HC RX W HCPCS: Performed by: EMERGENCY MEDICINE

## 2019-01-10 PROCEDURE — 99283 EMERGENCY DEPT VISIT LOW MDM: CPT

## 2019-01-10 PROCEDURE — 96372 THER/PROPH/DIAG INJ SC/IM: CPT

## 2019-01-10 RX ORDER — ONDANSETRON 2 MG/ML
4 INJECTION INTRAMUSCULAR; INTRAVENOUS ONCE
Status: DISCONTINUED | OUTPATIENT
Start: 2019-01-10 | End: 2019-01-10

## 2019-01-10 RX ORDER — ONDANSETRON 4 MG/1
4 TABLET, ORALLY DISINTEGRATING ORAL ONCE
Status: COMPLETED | OUTPATIENT
Start: 2019-01-10 | End: 2019-01-10

## 2019-01-10 RX ORDER — NALOXONE HYDROCHLORIDE 1 MG/ML
3 INJECTION INTRAMUSCULAR; INTRAVENOUS; SUBCUTANEOUS ONCE
Status: COMPLETED | OUTPATIENT
Start: 2019-01-10 | End: 2019-01-10

## 2019-01-10 RX ADMIN — ONDANSETRON 4 MG: 4 TABLET, ORALLY DISINTEGRATING ORAL at 02:19

## 2019-01-10 RX ADMIN — NALOXONE HYDROCHLORIDE 3 MG: 1 INJECTION PARENTERAL at 02:19

## 2019-07-07 ENCOUNTER — HOSPITAL ENCOUNTER (EMERGENCY)
Age: 44
Discharge: HOME | End: 2019-07-07
Attending: FAMILY MEDICINE
Payer: COMMERCIAL

## 2019-07-07 VITALS
HEART RATE: 90 BPM | RESPIRATION RATE: 17 BRPM | WEIGHT: 150 LBS | BODY MASS INDEX: 24.11 KG/M2 | DIASTOLIC BLOOD PRESSURE: 81 MMHG | SYSTOLIC BLOOD PRESSURE: 126 MMHG | TEMPERATURE: 97.8 F | HEIGHT: 66 IN | OXYGEN SATURATION: 98 %

## 2019-07-07 DIAGNOSIS — M06.9 RHEUMATOID ARTHRITIS FLARE (HCC): Primary | ICD-10-CM

## 2019-07-07 LAB
ALBUMIN SERPL-MCNC: 3.3 G/DL (ref 3.5–4.6)
ALP BLD-CCNC: 93 U/L (ref 40–130)
ALT SERPL-CCNC: 9 U/L (ref 0–33)
ANION GAP SERPL CALCULATED.3IONS-SCNC: 12 MEQ/L (ref 9–15)
AST SERPL-CCNC: 14 U/L (ref 0–35)
BASOPHILS ABSOLUTE: 0 K/UL (ref 0–0.2)
BASOPHILS RELATIVE PERCENT: 0.4 %
BILIRUB SERPL-MCNC: 0.3 MG/DL (ref 0.2–0.7)
BUN BLDV-MCNC: 8 MG/DL (ref 6–20)
C-REACTIVE PROTEIN: 8.8 MG/L (ref 0–5)
CALCIUM SERPL-MCNC: 8.8 MG/DL (ref 8.5–9.9)
CHLORIDE BLD-SCNC: 103 MEQ/L (ref 95–107)
CO2: 25 MEQ/L (ref 20–31)
CREAT SERPL-MCNC: 0.65 MG/DL (ref 0.5–0.9)
EOSINOPHILS ABSOLUTE: 0.1 K/UL (ref 0–0.7)
EOSINOPHILS RELATIVE PERCENT: 1.1 %
GFR AFRICAN AMERICAN: >60
GFR NON-AFRICAN AMERICAN: >60
GLOBULIN: 4.2 G/DL (ref 2.3–3.5)
GLUCOSE BLD-MCNC: 119 MG/DL (ref 70–99)
HCT VFR BLD CALC: 36.6 % (ref 37–47)
HEMOGLOBIN: 12.5 G/DL (ref 12–16)
LYMPHOCYTES ABSOLUTE: 1.2 K/UL (ref 1–4.8)
LYMPHOCYTES RELATIVE PERCENT: 18.8 %
MCH RBC QN AUTO: 27.6 PG (ref 27–31.3)
MCHC RBC AUTO-ENTMCNC: 34.3 % (ref 33–37)
MCV RBC AUTO: 80.7 FL (ref 82–100)
MONOCYTES ABSOLUTE: 0.3 K/UL (ref 0.2–0.8)
MONOCYTES RELATIVE PERCENT: 4.5 %
NEUTROPHILS ABSOLUTE: 4.7 K/UL (ref 1.4–6.5)
NEUTROPHILS RELATIVE PERCENT: 75.2 %
PDW BLD-RTO: 14.4 % (ref 11.5–14.5)
PLATELET # BLD: 265 K/UL (ref 130–400)
POTASSIUM SERPL-SCNC: 3.9 MEQ/L (ref 3.4–4.9)
RBC # BLD: 4.53 M/UL (ref 4.2–5.4)
RHEUMATOID FACTOR: 91.2 IU/ML (ref 0–14)
SEDIMENTATION RATE, ERYTHROCYTE: 32 MM (ref 0–20)
SODIUM BLD-SCNC: 140 MEQ/L (ref 135–144)
TOTAL PROTEIN: 7.5 G/DL (ref 6.3–8)
WBC # BLD: 6.3 K/UL (ref 4.8–10.8)

## 2019-07-07 PROCEDURE — 99283 EMERGENCY DEPT VISIT LOW MDM: CPT

## 2019-07-07 PROCEDURE — 6360000002 HC RX W HCPCS: Performed by: FAMILY MEDICINE

## 2019-07-07 PROCEDURE — 80053 COMPREHEN METABOLIC PANEL: CPT

## 2019-07-07 PROCEDURE — 85025 COMPLETE CBC W/AUTO DIFF WBC: CPT

## 2019-07-07 PROCEDURE — 86431 RHEUMATOID FACTOR QUANT: CPT

## 2019-07-07 PROCEDURE — 36415 COLL VENOUS BLD VENIPUNCTURE: CPT

## 2019-07-07 PROCEDURE — 86140 C-REACTIVE PROTEIN: CPT

## 2019-07-07 PROCEDURE — 96372 THER/PROPH/DIAG INJ SC/IM: CPT

## 2019-07-07 PROCEDURE — 85652 RBC SED RATE AUTOMATED: CPT

## 2019-07-07 RX ORDER — KETOROLAC TROMETHAMINE 30 MG/ML
60 INJECTION, SOLUTION INTRAMUSCULAR; INTRAVENOUS ONCE
Status: COMPLETED | OUTPATIENT
Start: 2019-07-07 | End: 2019-07-07

## 2019-07-07 RX ORDER — PREDNISONE 10 MG/1
TABLET ORAL
Qty: 30 TABLET | Refills: 0 | Status: SHIPPED | OUTPATIENT
Start: 2019-07-07 | End: 2019-07-17

## 2019-07-07 RX ORDER — METHYLPREDNISOLONE SODIUM SUCCINATE 125 MG/2ML
125 INJECTION, POWDER, LYOPHILIZED, FOR SOLUTION INTRAMUSCULAR; INTRAVENOUS ONCE
Status: COMPLETED | OUTPATIENT
Start: 2019-07-07 | End: 2019-07-07

## 2019-07-07 RX ORDER — TRAMADOL HYDROCHLORIDE 50 MG/1
50 TABLET ORAL EVERY 4 HOURS PRN
Qty: 18 TABLET | Refills: 0 | Status: SHIPPED | OUTPATIENT
Start: 2019-07-07 | End: 2019-07-10

## 2019-07-07 RX ADMIN — KETOROLAC TROMETHAMINE 60 MG: 30 INJECTION, SOLUTION INTRAMUSCULAR at 11:02

## 2019-07-07 RX ADMIN — METHYLPREDNISOLONE SODIUM SUCCINATE 125 MG: 125 INJECTION, POWDER, FOR SOLUTION INTRAMUSCULAR; INTRAVENOUS at 11:01

## 2019-07-07 ASSESSMENT — PAIN SCALES - GENERAL
PAINLEVEL_OUTOF10: 7
PAINLEVEL_OUTOF10: 7

## 2019-07-07 ASSESSMENT — ENCOUNTER SYMPTOMS: RESPIRATORY NEGATIVE: 1

## 2019-07-07 ASSESSMENT — PAIN DESCRIPTION - PAIN TYPE: TYPE: CHRONIC PAIN

## 2019-07-07 NOTE — ED PROVIDER NOTES
TUBAL LIGATION           CURRENT MEDICATIONS       Discharge Medication List as of 7/7/2019 11:55 AM      CONTINUE these medications which have NOT CHANGED    Details   vitamin B-12 (CYANOCOBALAMIN) 1000 MCG tablet Take 1,000 mcg by mouth dailyHistorical Med      clonazePAM (KLONOPIN) 0.5 MG tablet Take 0.5 mg by mouth 3 times daily as needed. Lavada Saucer Historical Med      ALPRAZolam (XANAX) 0.25 MG tablet Take 0.25 mg by mouth 3 times daily as needed for Anxiety. Lavada Saucer Historical Med      busPIRone (BUSPAR) 5 MG tablet Take 5 mg by mouth 2 times dailyHistorical Med      ondansetron (ZOFRAN) 4 MG tablet Take 1 tablet by mouth every 8 hours as needed for Nausea or Vomiting, Disp-6 tablet, R-0Print      naproxen (NAPROSYN) 375 MG tablet 1 tablet every 12 hours for pain, Disp-20 tablet, R-0Print      Ergocalciferol (VITAMIN D2 PO) Take 50,000 Units by mouth 3x/ week. Monday, Wed., and  Fridays for 8 weeks. Historical Med      buprenorphine-naloxone (SUBOXONE) 2-0.5 MG SUBL Place 8 mg under the tongue daily. ALLERGIES     Humira [adalimumab]    FAMILY HISTORY     No family history on file. SOCIAL HISTORY       Social History     Socioeconomic History    Marital status: Single     Spouse name: Not on file    Number of children: Not on file    Years of education: Not on file    Highest education level: Not on file   Occupational History    Not on file   Social Needs    Financial resource strain: Not on file    Food insecurity:     Worry: Not on file     Inability: Not on file    Transportation needs:     Medical: Not on file     Non-medical: Not on file   Tobacco Use    Smoking status: Current Every Day Smoker     Packs/day: 1.00     Years: 1.00     Pack years: 1.00     Types: Cigarettes    Smokeless tobacco: Never Used   Substance and Sexual Activity    Alcohol use: No    Drug use: Yes     Types:  Other-see comments     Comment: heroin    Sexual activity: Yes     Partners: Male   Lifestyle    Physical activity:     Days per week: Not on file     Minutes per session: Not on file    Stress: Not on file   Relationships    Social connections:     Talks on phone: Not on file     Gets together: Not on file     Attends Nondenominational service: Not on file     Active member of club or organization: Not on file     Attends meetings of clubs or organizations: Not on file     Relationship status: Not on file    Intimate partner violence:     Fear of current or ex partner: Not on file     Emotionally abused: Not on file     Physically abused: Not on file     Forced sexual activity: Not on file   Other Topics Concern    Not on file   Social History Narrative    Not on file       SCREENINGS      @[id number])@      PHYSICAL EXAM    (up to 7 for level 4, 8 or more for level 5)     ED Triage Vitals [07/07/19 1015]   BP Temp Temp Source Pulse Resp SpO2 Height Weight   126/81 97.8 °F (36.6 °C) Oral -- 17 98 % 5' 6\" (1.676 m) 150 lb (68 kg)       Physical Exam   Constitutional: She is oriented to person, place, and time. She appears well-developed and well-nourished. HENT:   Head: Normocephalic and atraumatic. Right Ear: External ear normal.   Left Ear: External ear normal.   Nose: Nose normal.   Mouth/Throat: Oropharynx is clear and moist.   Eyes: Pupils are equal, round, and reactive to light. EOM are normal.   Neck: Normal range of motion. Neck supple. Cardiovascular: Normal rate, regular rhythm, normal heart sounds and intact distal pulses. Pulmonary/Chest: Effort normal and breath sounds normal. No respiratory distress. She has no wheezes. She has no rales. She exhibits no tenderness. Abdominal: Soft. Bowel sounds are normal.   Musculoskeletal:        Right wrist: She exhibits decreased range of motion, tenderness and swelling. Left wrist: She exhibits decreased range of motion and swelling. Right ankle: She exhibits decreased range of motion and swelling.         Left ankle: She exhibits decreased side effects, risk of tolerance/dependence & alternative treatments discussed.  (Suhas Elder MD)    (Please note thatportions of this note were completed with a voice recognition program.  Efforts were made to edit the dictations but occasionally words are mis-transcribed.)    Florin Castle MD (electronically signed)  Attending Emergency Physician            Suhas Elder MD  07/07/19 [phone]

## 2019-07-31 ENCOUNTER — HOSPITAL ENCOUNTER (EMERGENCY)
Age: 44
Discharge: HOME | End: 2019-07-31
Payer: COMMERCIAL

## 2019-07-31 VITALS
WEIGHT: 150 LBS | SYSTOLIC BLOOD PRESSURE: 109 MMHG | HEIGHT: 66 IN | DIASTOLIC BLOOD PRESSURE: 81 MMHG | TEMPERATURE: 98.3 F | RESPIRATION RATE: 18 BRPM | BODY MASS INDEX: 24.11 KG/M2 | HEART RATE: 92 BPM | OXYGEN SATURATION: 96 %

## 2019-07-31 DIAGNOSIS — M06.9 RHEUMATOID ARTHRITIS FLARE (HCC): Primary | ICD-10-CM

## 2019-07-31 DIAGNOSIS — R51.9 ACUTE NONINTRACTABLE HEADACHE, UNSPECIFIED HEADACHE TYPE: ICD-10-CM

## 2019-07-31 PROCEDURE — 6370000000 HC RX 637 (ALT 250 FOR IP): Performed by: NURSE PRACTITIONER

## 2019-07-31 PROCEDURE — 96372 THER/PROPH/DIAG INJ SC/IM: CPT

## 2019-07-31 PROCEDURE — 99283 EMERGENCY DEPT VISIT LOW MDM: CPT

## 2019-07-31 PROCEDURE — 6360000002 HC RX W HCPCS: Performed by: NURSE PRACTITIONER

## 2019-07-31 RX ORDER — PREDNISONE 50 MG/1
50 TABLET ORAL DAILY
Qty: 5 TABLET | Refills: 0 | Status: SHIPPED | OUTPATIENT
Start: 2019-07-31 | End: 2019-08-05

## 2019-07-31 RX ORDER — PREDNISONE 20 MG/1
60 TABLET ORAL ONCE
Status: COMPLETED | OUTPATIENT
Start: 2019-07-31 | End: 2019-07-31

## 2019-07-31 RX ORDER — KETOROLAC TROMETHAMINE 30 MG/ML
60 INJECTION, SOLUTION INTRAMUSCULAR; INTRAVENOUS ONCE
Status: COMPLETED | OUTPATIENT
Start: 2019-07-31 | End: 2019-07-31

## 2019-07-31 RX ORDER — TRAMADOL HYDROCHLORIDE 50 MG/1
50 TABLET ORAL EVERY 6 HOURS PRN
Qty: 12 TABLET | Refills: 0 | Status: SHIPPED | OUTPATIENT
Start: 2019-07-31 | End: 2019-08-03

## 2019-07-31 RX ORDER — TRAMADOL HYDROCHLORIDE 50 MG/1
50 TABLET ORAL ONCE
Status: COMPLETED | OUTPATIENT
Start: 2019-07-31 | End: 2019-07-31

## 2019-07-31 RX ADMIN — KETOROLAC TROMETHAMINE 60 MG: 30 INJECTION, SOLUTION INTRAMUSCULAR; INTRAVENOUS at 05:17

## 2019-07-31 RX ADMIN — PREDNISONE 60 MG: 20 TABLET ORAL at 05:17

## 2019-07-31 RX ADMIN — TRAMADOL HYDROCHLORIDE 50 MG: 50 TABLET, FILM COATED ORAL at 05:17

## 2019-07-31 ASSESSMENT — ENCOUNTER SYMPTOMS
ABDOMINAL PAIN: 0
NAUSEA: 0
ABDOMINAL DISTENTION: 0
RHINORRHEA: 0
CONSTIPATION: 0
CHEST TIGHTNESS: 0
COLOR CHANGE: 0
COUGH: 0
DIARRHEA: 0
VOMITING: 0
BACK PAIN: 1
WHEEZING: 0
SHORTNESS OF BREATH: 0
TROUBLE SWALLOWING: 0
SORE THROAT: 0

## 2019-07-31 ASSESSMENT — PAIN SCALES - GENERAL
PAINLEVEL_OUTOF10: 9
PAINLEVEL_OUTOF10: 9

## 2019-07-31 ASSESSMENT — PAIN DESCRIPTION - FREQUENCY: FREQUENCY: CONTINUOUS

## 2019-07-31 ASSESSMENT — PAIN DESCRIPTION - PAIN TYPE: TYPE: ACUTE PAIN

## 2019-07-31 ASSESSMENT — PAIN DESCRIPTION - ORIENTATION: ORIENTATION: RIGHT;LEFT

## 2019-07-31 ASSESSMENT — PAIN DESCRIPTION - DESCRIPTORS: DESCRIPTORS: HEADACHE;ACHING

## 2019-07-31 ASSESSMENT — PAIN DESCRIPTION - LOCATION: LOCATION: HEAD;ARM;LEG

## 2019-07-31 NOTE — ED PROVIDER NOTES
any recent falls injuries or illnesses. Nursing Notes were reviewed. REVIEW OF SYSTEMS    (2-9 systems for level 4, 10 or more for level 5)     Review of Systems   Constitutional: Negative for activity change, appetite change, chills, diaphoresis, fatigue and fever. HENT: Negative for congestion, ear pain, rhinorrhea, sore throat and trouble swallowing. Eyes: Negative for visual disturbance. Respiratory: Negative for cough, chest tightness, shortness of breath and wheezing. Cardiovascular: Negative for chest pain and palpitations. Gastrointestinal: Negative for abdominal distention, abdominal pain, constipation, diarrhea, nausea and vomiting. Genitourinary: Negative for difficulty urinating, dysuria, flank pain, frequency and urgency. Musculoskeletal: Positive for arthralgias, back pain, joint swelling and myalgias. Negative for gait problem, neck pain and neck stiffness. Skin: Negative for color change, rash and wound. Neurological: Positive for headaches. Negative for dizziness, tremors, seizures, syncope, speech difficulty, weakness, light-headedness and numbness. Except as noted above the remainder of the review of systems was reviewed and negative.        PAST MEDICAL HISTORY     Past Medical History:   Diagnosis Date    Arthritis     Asthma     Hepatitis C     Rheumatoid arteritis      Past Surgical History:   Procedure Laterality Date    HYSTERECTOMY      TUBAL LIGATION       Social History     Socioeconomic History    Marital status: Single     Spouse name: None    Number of children: None    Years of education: None    Highest education level: None   Occupational History    None   Social Needs    Financial resource strain: None    Food insecurity:     Worry: None     Inability: None    Transportation needs:     Medical: None     Non-medical: None   Tobacco Use    Smoking status: Current Every Day Smoker     Packs/day: 1.00     Years: 1.00     Pack years: 1.00 tenderness. She exhibits normal range of motion, no swelling, no effusion, no ecchymosis, no deformity, no laceration, normal alignment, no LCL laxity, normal meniscus and no MCL laxity. Tenderness found. Left knee: She exhibits erythema and bony tenderness. She exhibits normal range of motion, no swelling, no effusion, no ecchymosis, no deformity, no laceration, normal alignment, no LCL laxity, normal meniscus and no MCL laxity. Tenderness found. Cervical back: Normal.        Thoracic back: Normal.        Lumbar back: She exhibits tenderness, pain and spasm. She exhibits normal range of motion, no bony tenderness, no swelling, no edema, no deformity and no laceration. Back:         Legs:  Neurological: She is alert and oriented to person, place, and time. No cranial nerve deficit or sensory deficit. She exhibits normal muscle tone. Coordination normal.   Skin: Skin is warm and dry. Capillary refill takes less than 2 seconds. She is not diaphoretic. RESULTS     EKG: All EKG's are interpreted by the Emergency Department Physician who either signs or Co-signsthis chart in the absence of a cardiologist.        RADIOLOGY:   Bowers Moles such as CT, Ultrasound and MRI are read by the radiologist. Plain radiographic images are visualized and preliminarily interpreted by the emergency physician with the below findings:        Interpretation per the Radiologist below, if available at the time ofthis note:    No orders to display         ED BEDSIDE ULTRASOUND:   Performed by ED Physician - none    LABS:  Labs Reviewed - No data to display    All other labs were within normal range or not returned as of this dictation.     EMERGENCY DEPARTMENT COURSE and DIFFERENTIAL DIAGNOSIS/MDM:   Vitals:    Vitals:    07/31/19 0430   BP: 109/81   Pulse: 92   Resp: 18   Temp: 98.3 °F (36.8 °C)   TempSrc: Oral   SpO2: 96%   Weight: 150 lb (68 kg)   Height: 5' 6\" (1.676 m)            MDM patient presents CONSULTS:  None    PROCEDURES:  Unless otherwise noted below, none     Procedures    FINAL IMPRESSION      1. Rheumatoid arthritis flare (HCC)    2. Acute nonintractable headache, unspecified headache type          DISPOSITION/PLAN   DISPOSITION        PATIENT REFERRED TO:  Austin Gutierrez MD  [address] [place] [address]  [phone]    Call in 1 day        DISCHARGE MEDICATIONS:  New Prescriptions    PREDNISONE (DELTASONE) 50 MG TABLET    Take 1 tablet by mouth daily for 5 days    TRAMADOL (ULTRAM) 50 MG TABLET    Take 1 tablet by mouth every 6 hours as needed for Pain for up to 3 days. Intended supply: 3 days.  Take lowest dose possible to manage pain          (Please notethat portions of this note were completed with a voice recognition program.  Efforts were made to edit the dictations but occasionally words are mis-transcribed.)    REJI Somers CNP (electronically signed)  Attending Emergency Physician         REJI Somers CNP  07/31/19 8403

## 2019-07-31 NOTE — ED NOTES
Pt was given d/c instructions, follow up care instructions, and prescriptions. Pt at this time is a+ox4, no signs of distress, and was ambulatory on exit. Pt has no questions and states understanding of information given.       Liam Hendricks RN  07/31/19 3343

## 2019-08-27 ENCOUNTER — HOSPITAL ENCOUNTER (EMERGENCY)
Age: 44
Discharge: HOME | End: 2019-08-27
Payer: COMMERCIAL

## 2019-08-27 VITALS
OXYGEN SATURATION: 98 % | SYSTOLIC BLOOD PRESSURE: 139 MMHG | TEMPERATURE: 97.8 F | HEART RATE: 99 BPM | DIASTOLIC BLOOD PRESSURE: 94 MMHG | RESPIRATION RATE: 17 BRPM | BODY MASS INDEX: 23.95 KG/M2 | HEIGHT: 66 IN | WEIGHT: 149 LBS

## 2019-08-27 DIAGNOSIS — M25.562 CHRONIC PAIN OF BOTH KNEES: ICD-10-CM

## 2019-08-27 DIAGNOSIS — G89.29 ACUTE EXACERBATION OF CHRONIC LOW BACK PAIN: Primary | ICD-10-CM

## 2019-08-27 DIAGNOSIS — M54.50 ACUTE EXACERBATION OF CHRONIC LOW BACK PAIN: Primary | ICD-10-CM

## 2019-08-27 DIAGNOSIS — M25.561 CHRONIC PAIN OF BOTH KNEES: ICD-10-CM

## 2019-08-27 DIAGNOSIS — G89.29 CHRONIC PAIN OF BOTH KNEES: ICD-10-CM

## 2019-08-27 PROCEDURE — 96372 THER/PROPH/DIAG INJ SC/IM: CPT

## 2019-08-27 PROCEDURE — 6360000002 HC RX W HCPCS: Performed by: NURSE PRACTITIONER

## 2019-08-27 PROCEDURE — 99282 EMERGENCY DEPT VISIT SF MDM: CPT

## 2019-08-27 RX ORDER — ORPHENADRINE CITRATE 30 MG/ML
60 INJECTION INTRAMUSCULAR; INTRAVENOUS ONCE
Status: COMPLETED | OUTPATIENT
Start: 2019-08-27 | End: 2019-08-27

## 2019-08-27 RX ORDER — KETOROLAC TROMETHAMINE 30 MG/ML
30 INJECTION, SOLUTION INTRAMUSCULAR; INTRAVENOUS ONCE
Status: COMPLETED | OUTPATIENT
Start: 2019-08-27 | End: 2019-08-27

## 2019-08-27 RX ADMIN — KETOROLAC TROMETHAMINE 30 MG: 30 INJECTION, SOLUTION INTRAMUSCULAR at 16:52

## 2019-08-27 RX ADMIN — ORPHENADRINE CITRATE 60 MG: 30 INJECTION INTRAMUSCULAR; INTRAVENOUS at 16:53

## 2019-08-27 ASSESSMENT — ENCOUNTER SYMPTOMS
BACK PAIN: 1
VOMITING: 0
COUGH: 0
ABDOMINAL PAIN: 0
NAUSEA: 0
SHORTNESS OF BREATH: 0
DIARRHEA: 0
SORE THROAT: 0

## 2019-08-27 ASSESSMENT — PAIN SCALES - GENERAL
PAINLEVEL_OUTOF10: 9
PAINLEVEL_OUTOF10: 9

## 2019-08-27 ASSESSMENT — PAIN DESCRIPTION - LOCATION: LOCATION: BACK;HAND;KNEE

## 2019-08-27 NOTE — ED PROVIDER NOTES
and EOM are normal.   Neck: Normal range of motion. Neck supple. Cardiovascular: Normal rate and regular rhythm. Pulmonary/Chest: Effort normal and breath sounds normal.   Abdominal: Soft. Bowel sounds are normal. She exhibits no distension. There is no tenderness. Musculoskeletal: Normal range of motion. Right knee: She exhibits normal range of motion, no swelling and no effusion. Tenderness found. Left knee: She exhibits normal range of motion, no swelling and no effusion. Tenderness found. Lumbar back: She exhibits tenderness. Back:         Legs:  Neurological: She is alert and oriented to person, place, and time. She has normal reflexes. Skin: Skin is warm and dry. Psychiatric: Judgment normal.   Nursing note and vitals reviewed. All other labs were within normal range or not returned as of this dictation. EMERGENCY DEPARTMENT COURSE and DIFFERENTIALDIAGNOSIS/MDM:   Vitals:    Vitals:    08/27/19 1626   BP: (!) 139/94   Pulse: 99   Resp: 17   Temp: 97.8 °F (36.6 °C)   TempSrc: Oral   SpO2: 98%   Weight: 149 lb (67.6 kg)   Height: 5' 6\" (1.676 m)            37 yr old female with back and knee pain. Prescription for Voltaren was given to the patient. F/U with PCP in 2-3 days. Patient verbalizes understanding. PROCEDURES:  Unless otherwise noted below, none     Procedures      FINAL IMPRESSION      1. Acute exacerbation of chronic low back pain    2.  Chronic pain of both knees          DISPOSITION/PLAN   DISPOSITION Decision To Discharge 08/27/2019 05:04:31 PM          REJI Adams CNP (electronically signed)  Attending Emergency Physician     REJI Adams CNP  08/27/19 3596

## 2019-09-24 ENCOUNTER — APPOINTMENT (OUTPATIENT)
Dept: CT IMAGING | Age: 44
End: 2019-09-24
Payer: COMMERCIAL

## 2019-09-24 ENCOUNTER — HOSPITAL ENCOUNTER (EMERGENCY)
Age: 44
Discharge: HOME | End: 2019-09-24
Attending: FAMILY MEDICINE
Payer: COMMERCIAL

## 2019-09-24 VITALS
RESPIRATION RATE: 18 BRPM | HEART RATE: 72 BPM | WEIGHT: 174 LBS | SYSTOLIC BLOOD PRESSURE: 142 MMHG | DIASTOLIC BLOOD PRESSURE: 95 MMHG | HEIGHT: 66 IN | OXYGEN SATURATION: 99 % | BODY MASS INDEX: 27.97 KG/M2 | TEMPERATURE: 97.6 F

## 2019-09-24 DIAGNOSIS — M79.10 MUSCLE PAIN: ICD-10-CM

## 2019-09-24 DIAGNOSIS — R10.9 LEFT FLANK PAIN: Primary | ICD-10-CM

## 2019-09-24 LAB
ALBUMIN SERPL-MCNC: 3.5 G/DL (ref 3.5–4.6)
ALP BLD-CCNC: 85 U/L (ref 40–130)
ALT SERPL-CCNC: 15 U/L (ref 0–33)
AMYLASE: 69 U/L (ref 22–93)
ANION GAP SERPL CALCULATED.3IONS-SCNC: 12 MEQ/L (ref 9–15)
AST SERPL-CCNC: 17 U/L (ref 0–35)
BASOPHILS ABSOLUTE: 0 K/UL (ref 0–0.2)
BASOPHILS RELATIVE PERCENT: 0.7 %
BILIRUB SERPL-MCNC: <0.2 MG/DL (ref 0.2–0.7)
BILIRUBIN URINE: NEGATIVE
BLOOD, URINE: NEGATIVE
BUN BLDV-MCNC: 15 MG/DL (ref 6–20)
CALCIUM SERPL-MCNC: 8.8 MG/DL (ref 8.5–9.9)
CHLORIDE BLD-SCNC: 105 MEQ/L (ref 95–107)
CLARITY: ABNORMAL
CO2: 25 MEQ/L (ref 20–31)
COLOR: YELLOW
CREAT SERPL-MCNC: 0.63 MG/DL (ref 0.5–0.9)
EOSINOPHILS ABSOLUTE: 0.1 K/UL (ref 0–0.7)
EOSINOPHILS RELATIVE PERCENT: 1.8 %
GFR AFRICAN AMERICAN: >60
GFR NON-AFRICAN AMERICAN: >60
GLOBULIN: 3.9 G/DL (ref 2.3–3.5)
GLUCOSE BLD-MCNC: 87 MG/DL (ref 70–99)
GLUCOSE URINE: NEGATIVE MG/DL
HCG(URINE) PREGNANCY TEST: NEGATIVE
HCG, URINE, POC: NEGATIVE
HCT VFR BLD CALC: 40.9 % (ref 37–47)
HEMOGLOBIN: 13.6 G/DL (ref 12–16)
INFLUENZA A BY PCR: NEGATIVE
INFLUENZA B BY PCR: NEGATIVE
KETONES, URINE: NEGATIVE MG/DL
LEUKOCYTE ESTERASE, URINE: NEGATIVE
LIPASE: 25 U/L (ref 12–95)
LYMPHOCYTES ABSOLUTE: 2.2 K/UL (ref 1–4.8)
LYMPHOCYTES RELATIVE PERCENT: 29.5 %
Lab: NORMAL
MCH RBC QN AUTO: 27.7 PG (ref 27–31.3)
MCHC RBC AUTO-ENTMCNC: 33.2 % (ref 33–37)
MCV RBC AUTO: 83.6 FL (ref 82–100)
MONOCYTES ABSOLUTE: 0.4 K/UL (ref 0.2–0.8)
MONOCYTES RELATIVE PERCENT: 5.6 %
NEGATIVE QC PASS/FAIL: NORMAL
NEUTROPHILS ABSOLUTE: 4.6 K/UL (ref 1.4–6.5)
NEUTROPHILS RELATIVE PERCENT: 62.4 %
NITRITE, URINE: NEGATIVE
PDW BLD-RTO: 16.4 % (ref 11.5–14.5)
PH UA: 7.5 (ref 5–9)
PLATELET # BLD: 266 K/UL (ref 130–400)
POSITIVE QC PASS/FAIL: NORMAL
POTASSIUM REFLEX MAGNESIUM: 3.6 MEQ/L (ref 3.4–4.9)
PROTEIN UA: NEGATIVE MG/DL
RBC # BLD: 4.9 M/UL (ref 4.2–5.4)
SODIUM BLD-SCNC: 142 MEQ/L (ref 135–144)
SPECIFIC GRAVITY UA: 1.02 (ref 1–1.03)
TOTAL PROTEIN: 7.4 G/DL (ref 6.3–8)
UROBILINOGEN, URINE: 0.2 E.U./DL
WBC # BLD: 7.4 K/UL (ref 4.8–10.8)

## 2019-09-24 PROCEDURE — 96375 TX/PRO/DX INJ NEW DRUG ADDON: CPT

## 2019-09-24 PROCEDURE — 84703 CHORIONIC GONADOTROPIN ASSAY: CPT

## 2019-09-24 PROCEDURE — 96374 THER/PROPH/DIAG INJ IV PUSH: CPT

## 2019-09-24 PROCEDURE — 74177 CT ABD & PELVIS W/CONTRAST: CPT

## 2019-09-24 PROCEDURE — 87086 URINE CULTURE/COLONY COUNT: CPT

## 2019-09-24 PROCEDURE — 36415 COLL VENOUS BLD VENIPUNCTURE: CPT

## 2019-09-24 PROCEDURE — 80053 COMPREHEN METABOLIC PANEL: CPT

## 2019-09-24 PROCEDURE — 83690 ASSAY OF LIPASE: CPT

## 2019-09-24 PROCEDURE — 99284 EMERGENCY DEPT VISIT MOD MDM: CPT

## 2019-09-24 PROCEDURE — 87502 INFLUENZA DNA AMP PROBE: CPT

## 2019-09-24 PROCEDURE — 82150 ASSAY OF AMYLASE: CPT

## 2019-09-24 PROCEDURE — 2580000003 HC RX 258: Performed by: FAMILY MEDICINE

## 2019-09-24 PROCEDURE — 6360000002 HC RX W HCPCS: Performed by: FAMILY MEDICINE

## 2019-09-24 PROCEDURE — 85025 COMPLETE CBC W/AUTO DIFF WBC: CPT

## 2019-09-24 PROCEDURE — 6360000004 HC RX CONTRAST MEDICATION: Performed by: FAMILY MEDICINE

## 2019-09-24 PROCEDURE — 87040 BLOOD CULTURE FOR BACTERIA: CPT

## 2019-09-24 PROCEDURE — 81003 URINALYSIS AUTO W/O SCOPE: CPT

## 2019-09-24 RX ORDER — 0.9 % SODIUM CHLORIDE 0.9 %
1000 INTRAVENOUS SOLUTION INTRAVENOUS ONCE
Status: COMPLETED | OUTPATIENT
Start: 2019-09-24 | End: 2019-09-24

## 2019-09-24 RX ORDER — ONDANSETRON 2 MG/ML
4 INJECTION INTRAMUSCULAR; INTRAVENOUS ONCE
Status: COMPLETED | OUTPATIENT
Start: 2019-09-24 | End: 2019-09-24

## 2019-09-24 RX ORDER — KETOROLAC TROMETHAMINE 30 MG/ML
30 INJECTION, SOLUTION INTRAMUSCULAR; INTRAVENOUS ONCE
Status: COMPLETED | OUTPATIENT
Start: 2019-09-24 | End: 2019-09-24

## 2019-09-24 RX ADMIN — KETOROLAC TROMETHAMINE 30 MG: 30 INJECTION, SOLUTION INTRAMUSCULAR at 18:41

## 2019-09-24 RX ADMIN — ONDANSETRON 4 MG: 2 INJECTION INTRAMUSCULAR; INTRAVENOUS at 17:39

## 2019-09-24 RX ADMIN — SODIUM CHLORIDE 1000 ML: 9 INJECTION, SOLUTION INTRAVENOUS at 17:40

## 2019-09-24 RX ADMIN — IOPAMIDOL 100 ML: 612 INJECTION, SOLUTION INTRAVENOUS at 17:50

## 2019-09-24 ASSESSMENT — PAIN DESCRIPTION - DESCRIPTORS: DESCRIPTORS: ACHING;BURNING

## 2019-09-24 ASSESSMENT — PAIN DESCRIPTION - PAIN TYPE: TYPE: ACUTE PAIN

## 2019-09-24 ASSESSMENT — ENCOUNTER SYMPTOMS
RESPIRATORY NEGATIVE: 1
EYES NEGATIVE: 1
ABDOMINAL PAIN: 1
ALLERGIC/IMMUNOLOGIC NEGATIVE: 1
BACK PAIN: 0
SHORTNESS OF BREATH: 0

## 2019-09-24 ASSESSMENT — PAIN DESCRIPTION - ORIENTATION: ORIENTATION: LEFT

## 2019-09-24 ASSESSMENT — PAIN SCALES - GENERAL
PAINLEVEL_OUTOF10: 6
PAINLEVEL_OUTOF10: 7

## 2019-09-24 ASSESSMENT — PAIN DESCRIPTION - LOCATION: LOCATION: FLANK

## 2019-09-24 ASSESSMENT — PAIN DESCRIPTION - FREQUENCY: FREQUENCY: CONTINUOUS

## 2019-09-24 NOTE — ED PROVIDER NOTES
follow-up with her primary      CONSULTS:  None    PROCEDURES:  Unless otherwise noted below, none     Procedures    FINAL IMPRESSION      1. Left flank pain    2. Muscle pain          DISPOSITION/PLAN   DISPOSITION Decision To Discharge 09/24/2019 06:38:43 PM      PATIENT REFERRED TO:  No follow-up provider specified.     DISCHARGE MEDICATIONS:  Discharge Medication List as of 9/24/2019  6:39 PM             (Please note thatportions of this note were completed with a voice recognition program.  Efforts were made to edit the dictations but occasionally words are mis-transcribed.)    Ellyn Esposito MD (electronically signed)  Attending Emergency Physician           Steph Mercer MD  09/24/19 2547

## 2019-09-25 LAB
GFR AFRICAN AMERICAN: >60
GFR NON-AFRICAN AMERICAN: >60
PERFORMED ON: NORMAL
POC CREATININE: 0.7 MG/DL (ref 0.6–1.1)
POC SAMPLE TYPE: NORMAL

## 2019-09-26 LAB — URINE CULTURE, ROUTINE: NORMAL

## 2019-09-29 LAB
BLOOD CULTURE, ROUTINE: NORMAL
CULTURE, BLOOD 2: NORMAL

## 2021-12-21 PROBLEM — R76.11 NONSPECIFIC TUBERCULIN TEST REACTION: Status: ACTIVE | Noted: 2018-03-14

## 2021-12-21 PROBLEM — B19.20 VIRAL HEPATITIS C: Status: ACTIVE | Noted: 2018-03-14

## 2021-12-21 PROBLEM — M05.79 RHEUMATOID ARTHRITIS OF MULTIPLE SITES WITHOUT ORGAN OR SYSTEM INVOLVEMENT WITH POSITIVE RHEUMATOID FACTOR (HCC): Status: ACTIVE | Noted: 2017-10-05

## 2021-12-21 PROBLEM — M79.7 FIBROMYALGIA: Status: ACTIVE | Noted: 2017-10-05

## 2021-12-21 PROBLEM — R70.0 ELEVATED SED RATE: Status: ACTIVE | Noted: 2017-10-08

## 2021-12-21 PROBLEM — M85.80 BONE EROSION DETERMINED BY X-RAY: Status: ACTIVE | Noted: 2017-10-08

## 2021-12-21 PROBLEM — J45.909 ASTHMA: Status: ACTIVE | Noted: 2018-03-14

## 2021-12-23 ENCOUNTER — OFFICE VISIT (OUTPATIENT)
Dept: NEUROLOGY | Age: 46
End: 2021-12-23
Payer: MEDICARE

## 2021-12-23 VITALS
DIASTOLIC BLOOD PRESSURE: 78 MMHG | SYSTOLIC BLOOD PRESSURE: 118 MMHG | HEART RATE: 87 BPM | BODY MASS INDEX: 33.41 KG/M2 | WEIGHT: 207 LBS

## 2021-12-23 DIAGNOSIS — G43.109 MIGRAINE WITH AURA AND WITHOUT STATUS MIGRAINOSUS, NOT INTRACTABLE: ICD-10-CM

## 2021-12-23 DIAGNOSIS — G62.9 NEUROPATHY: Primary | ICD-10-CM

## 2021-12-23 PROCEDURE — 99204 OFFICE O/P NEW MOD 45 MIN: CPT | Performed by: STUDENT IN AN ORGANIZED HEALTH CARE EDUCATION/TRAINING PROGRAM

## 2021-12-23 RX ORDER — CYCLOBENZAPRINE HCL 5 MG
TABLET ORAL
COMMUNITY
Start: 2021-09-30 | End: 2022-02-24 | Stop reason: ALTCHOICE

## 2021-12-23 RX ORDER — RIMEGEPANT SULFATE 75 MG/75MG
75 TABLET, ORALLY DISINTEGRATING ORAL PRN
Qty: 4 TABLET | Refills: 0 | COMMUNITY
Start: 2021-12-23 | End: 2022-02-24 | Stop reason: ALTCHOICE

## 2021-12-23 RX ORDER — HYDROXYCHLOROQUINE SULFATE 200 MG/1
200 TABLET, FILM COATED ORAL 2 TIMES DAILY WITH MEALS
COMMUNITY
Start: 2021-03-30

## 2021-12-23 RX ORDER — ALBUTEROL SULFATE 90 UG/1
2 AEROSOL, METERED RESPIRATORY (INHALATION) EVERY 6 HOURS PRN
COMMUNITY
Start: 2020-02-24 | End: 2024-08-05

## 2021-12-23 RX ORDER — MIRTAZAPINE 45 MG/1
1 TABLET, FILM COATED ORAL NIGHTLY
COMMUNITY
Start: 2021-04-07

## 2021-12-23 RX ORDER — ATOGEPANT 60 MG/1
60 TABLET ORAL DAILY
Qty: 30 TABLET | Refills: 5 | Status: SHIPPED | OUTPATIENT
Start: 2021-12-23 | End: 2022-02-24 | Stop reason: ALTCHOICE

## 2021-12-23 RX ORDER — PRAZOSIN HYDROCHLORIDE 2 MG/1
1 CAPSULE ORAL NIGHTLY
COMMUNITY
Start: 2021-04-07

## 2021-12-23 RX ORDER — DULOXETIN HYDROCHLORIDE 30 MG/1
30 CAPSULE, DELAYED RELEASE ORAL 2 TIMES DAILY
Qty: 60 CAPSULE | Refills: 3 | Status: SHIPPED | OUTPATIENT
Start: 2021-12-23

## 2021-12-23 NOTE — PROGRESS NOTES
[address]  [address]  [place] [address]  [phone]     Date of Visit:  2021  Patient Name: Peggy Zazueta   Patient :  1975     CHIEF COMPLAINT:     Peggy Zazueta is a 55 y.o. female who presents today for an general visit to be evaluated for the following condition(s):  Chief Complaint   Patient presents with    New Patient     Pt states that she has had migraines for a long time now, she says she gets like a stab pain on the left side of her head it gets really bad. She says she gets nausea pretty bad as well. She says that she has a migraine about 3 times a week. She says that she has tried cymbalta, and some other things as well. She says cymbalta helped at first nut is not any longer. She says that she had a CT about a year ago. HISTORY OF PRESENT ILLNESS     HPI  Vazquez Barfield is a 78-year-old female with a past medical history of RA, asthma, fibromyalgia, viral hepatitis C, and current smoker who presents to establish care for migraine. Patient reports that she first began having migraines at 25year old. Patient was previously in the care of Dr. Thony Figueroa at [facility]. Patient reports that she gets migraine headaches at a frequency of 3 times weekly. Headaches usually last all day. Headaches are accompanied by nausea, vomiting, photophobia, phonophobia, and dizziness. Headache location varies effecting the right occipital and temporal regions. Patient has aura of blurred vision. Dr. Thony Figueroa at [facility] had started patient on Cymbalta for preventative treatment and flexeril for acute treatment. MRI of the cervical spine without contrast was done which showed mild degenerative changes without central canal or foraminal stenosis. No mention of atlanto-axial subluxation. Patient as upcoming fundoscopic exam and is regularly monitored by ophthalmology given that she is on plaquenil.  Patient reports having a CT of the head in the past, regularly. Reports not being able to swallow following taking seroquel, but used to take regularly.  Methotrexate Nausea And Vomiting       Patient Active Problem List   Diagnosis    Benzodiazepine abuse ([facility]    Abnormal CT scan, chest    Asthma    Pleuritic pain    Bilateral hand pain    Bone erosion determined by x-ray    Bronchitis, acute    Chronic venous embolism and thrombosis of axillary veins (HCC)    Depression    Elevated sed rate    Diarrhea    Fibromyalgia    Viral hepatitis C    Knee pain, bilateral    Knee pain, left    Lung anomaly    Multiple joint pain    Nicotine dependence, uncomplicated    Nonspecific tuberculin test reaction    Opiates and related narcotics causing adverse effect    Opioid dependence, continuous (HCC)    Pain of both elbows    Positive anti-CCP test    Rheumatoid arthritis of multiple sites without organ or system involvement with positive rheumatoid factor (HCC)    Swelling of knee joint    Synovitis of knee    Upper respiratory infection with cough and congestion    Urticaria    Vitamin D deficiency       Past Medical History:   Diagnosis Date    Arthritis     Asthma     Hepatitis C     Rheumatoid arteritis ([facility]        Past Surgical History:   Procedure Laterality Date    HYSTERECTOMY      TUBAL LIGATION          Social History     Socioeconomic History    Marital status: Single     Spouse name: None    Number of children: None    Years of education: None    Highest education level: None   Occupational History    None   Tobacco Use    Smoking status: Current Every Day Smoker     Packs/day: 1.00     Years: 1.00     Pack years: 1.00     Types: Cigarettes    Smokeless tobacco: Never Used   Vaping Use    Vaping Use: Never used   Substance and Sexual Activity    Alcohol use: No    Drug use: Yes     Types:  Other-see comments     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    Sexual activity: Yes     Partners: Male   Other Topics Concern    None   Social History Narrative    None     Social Determinants of Health     Financial Resource Strain:     Difficulty of Paying Living Expenses: Not on file   Food Insecurity:     Worried About Running Out of Food in the Last Year: Not on file    [place]erson of Food in the Last Year: Not on file   Transportation Needs:     Lack of Transportation (Medical): Not on file    Lack of Transportation (Non-Medical): Not on file   Physical Activity:     Days of Exercise per Week: Not on file    Minutes of Exercise per Session: Not on file   Stress:     Feeling of Stress : Not on file   Social Connections:     Frequency of Communication with Friends and Family: Not on file    Frequency of Social Gatherings with Friends and Family: Not on file    Attends [employer] Services: Not on file    Active Member of [address] [place] [address] [employer] or Organizations: Not on file    Attends Club or Organization Meetings: Not on file    Marital Status: Not on file   Intimate Partner Violence:     Fear of Current or Ex-Partner: Not on file    Emotionally Abused: Not on file    Physically Abused: Not on file    Sexually Abused: Not on file   Housing Stability:     Unable to Pay for Housing in the Last Year: Not on file    Number of [place] in the Last Year: Not on file    Unstable Housing in the Last Year: Not on file        PHYSICAL EXAM     Vitals:    12/23/21 0953   BP: 118/78   Site: Left Upper Arm   Position: Sitting   Cuff Size: Medium Adult   Pulse: 87   Weight: 207 lb (93.9 kg)     Physical Exam  Vitals and nursing note reviewed. Constitutional:       General: She is awake. Appearance: Normal appearance. HENT:      Head: Normocephalic and atraumatic. Eyes:      General: Lids are normal.      Extraocular Movements: Extraocular movements intact. Conjunctiva/sclera: Conjunctivae normal.      Pupils: Pupils are equal, round, and reactive to light. Cardiovascular:      Rate and Rhythm: Normal rate and regular rhythm. Pulses: Normal pulses. Heart sounds: Normal heart sounds. Pulmonary:      Effort: Pulmonary effort is normal.      Breath sounds: Normal breath sounds. Musculoskeletal:         General: Normal range of motion. Skin:     General: Skin is warm and dry. Neurological:      General: No focal deficit present. Mental Status: She is alert and oriented to person, place, and time. Cranial Nerves: No cranial nerve deficit. Sensory: No sensory deficit. Motor: No weakness. Coordination: Coordination normal.      Gait: Gait normal.      Deep Tendon Reflexes: Reflexes normal.   Psychiatric:         Mood and Affect: Mood normal.         Behavior: Behavior normal. Behavior is cooperative. Thought Content: Thought content normal.     Tenderness to palpation of right occiput, neck, and upper trapezius  Decreased ROM of neck  No pain on palpation of temporal arteries  Pain and crepitus in right TMJ without clear dislocation  Failed to overcome refractory error in fundoscopic exam  Pedal pulses 2+   No edema or swelling in BLE  Sensation to pinprick and vibration intact   ASSESSMENT/PLAN   Pleasant 51-year-old female here for establish care for chronic classic migraines with frequency of 12 headache days per month. Patient previously in the care of Dr. Hallie Lee at [facility]. Currently on Cymbalta for preventative and flexeril for acute treatment without adequate relief. Work up thus far has included MRI of the cervical spine without contrast which showed mild degenerative changes without central canal or foraminal stenosis. No mention of atlanto-axial subluxation. Patient has upcoming fundoscopic exam with ophthamology and is followed regularly due to being on plaquenil. Patient does not have any red flag symptoms of signs on exam, and I do not think imaging is warranted at this time. Will continue patient on Cymbalta as it is helpful for her neuropathy.  However, given continued frequency and severity of headaches, will start patient on 60 mg of Brandon Bare for preventative treatment. Will provide sample of Nurtec for acute treatment. Patient has significant muscle pain and tension in her occipital region, neck, and trapezius which is likely contributing to headaches. May benefit from occipital nerve blocks. May be a good candidate for Botox in the future if other treatments fail. Regarding neuropathy-like symptoms in BLE, does not quite fit into one category. Sensation, strength, and reflexes preserved. Less concerned for vascular issue as patient's pedal pulses are 2+ and no swelling. Unclear if neuropathy or fibromyalgia. Can explore further at next appointment. To follow up in 6-8 weeks or sooner if new or worsening symptoms.   COMMUNICATION:       Electronically signed by Hari Figueroa PA-C, PA-C on 12/23/2021 at 10:56 AM

## 2021-12-27 ASSESSMENT — ENCOUNTER SYMPTOMS
PHOTOPHOBIA: 0
NAUSEA: 0
BACK PAIN: 1
CHEST TIGHTNESS: 0
ALLERGIC/IMMUNOLOGIC NEGATIVE: 1
VOMITING: 0
DIARRHEA: 0
SHORTNESS OF BREATH: 0
TROUBLE SWALLOWING: 0

## 2022-01-26 ENCOUNTER — APPOINTMENT (OUTPATIENT)
Dept: GENERAL RADIOLOGY | Age: 47
End: 2022-01-26
Payer: MEDICARE

## 2022-01-26 ENCOUNTER — HOSPITAL ENCOUNTER (EMERGENCY)
Age: 47
Discharge: HOME | End: 2022-01-26
Attending: EMERGENCY MEDICINE
Payer: MEDICARE

## 2022-01-26 VITALS
HEIGHT: 65 IN | BODY MASS INDEX: 34.99 KG/M2 | TEMPERATURE: 98.2 F | OXYGEN SATURATION: 98 % | HEART RATE: 85 BPM | SYSTOLIC BLOOD PRESSURE: 140 MMHG | RESPIRATION RATE: 20 BRPM | WEIGHT: 210 LBS | DIASTOLIC BLOOD PRESSURE: 89 MMHG

## 2022-01-26 DIAGNOSIS — K29.90 GASTRITIS AND DUODENITIS: ICD-10-CM

## 2022-01-26 DIAGNOSIS — H65.00 ACUTE SEROUS OTITIS MEDIA, RECURRENCE NOT SPECIFIED, UNSPECIFIED LATERALITY: Primary | ICD-10-CM

## 2022-01-26 LAB
ALBUMIN SERPL-MCNC: 3.7 G/DL (ref 3.5–4.6)
ALP BLD-CCNC: 111 U/L (ref 40–130)
ALT SERPL-CCNC: 14 U/L (ref 0–33)
ANION GAP SERPL CALCULATED.3IONS-SCNC: 10 MEQ/L (ref 9–15)
AST SERPL-CCNC: 14 U/L (ref 0–35)
BASOPHILS ABSOLUTE: 0 K/UL (ref 0–0.2)
BASOPHILS RELATIVE PERCENT: 0.6 %
BILIRUB SERPL-MCNC: 0.3 MG/DL (ref 0.2–0.7)
BUN BLDV-MCNC: 10 MG/DL (ref 6–20)
CALCIUM SERPL-MCNC: 9.3 MG/DL (ref 8.5–9.9)
CHLORIDE BLD-SCNC: 105 MEQ/L (ref 95–107)
CO2: 28 MEQ/L (ref 20–31)
CREAT SERPL-MCNC: 0.68 MG/DL (ref 0.5–0.9)
EOSINOPHILS ABSOLUTE: 0.3 K/UL (ref 0–0.7)
EOSINOPHILS RELATIVE PERCENT: 3.4 %
GFR AFRICAN AMERICAN: >60
GFR NON-AFRICAN AMERICAN: >60
GLOBULIN: 3.7 G/DL (ref 2.3–3.5)
GLUCOSE BLD-MCNC: 92 MG/DL (ref 70–99)
HCT VFR BLD CALC: 38.6 % (ref 37–47)
HEMOGLOBIN: 13.3 G/DL (ref 12–16)
LYMPHOCYTES ABSOLUTE: 1.6 K/UL (ref 1–4.8)
LYMPHOCYTES RELATIVE PERCENT: 21.6 %
MCH RBC QN AUTO: 28.3 PG (ref 27–31.3)
MCHC RBC AUTO-ENTMCNC: 34.4 % (ref 33–37)
MCV RBC AUTO: 82.4 FL (ref 82–100)
MONOCYTES ABSOLUTE: 0.5 K/UL (ref 0.2–0.8)
MONOCYTES RELATIVE PERCENT: 6.1 %
NEUTROPHILS ABSOLUTE: 5.1 K/UL (ref 1.4–6.5)
NEUTROPHILS RELATIVE PERCENT: 68.3 %
PDW BLD-RTO: 13.4 % (ref 11.5–14.5)
PLATELET # BLD: 254 K/UL (ref 130–400)
POTASSIUM SERPL-SCNC: 5 MEQ/L (ref 3.4–4.9)
RBC # BLD: 4.69 M/UL (ref 4.2–5.4)
SODIUM BLD-SCNC: 143 MEQ/L (ref 135–144)
TOTAL CK: 86 U/L (ref 0–170)
TOTAL PROTEIN: 7.4 G/DL (ref 6.3–8)
TROPONIN: <0.01 NG/ML (ref 0–0.01)
WBC # BLD: 7.5 K/UL (ref 4.8–10.8)

## 2022-01-26 PROCEDURE — 82550 ASSAY OF CK (CPK): CPT

## 2022-01-26 PROCEDURE — 36415 COLL VENOUS BLD VENIPUNCTURE: CPT

## 2022-01-26 PROCEDURE — 71046 X-RAY EXAM CHEST 2 VIEWS: CPT

## 2022-01-26 PROCEDURE — 93005 ELECTROCARDIOGRAM TRACING: CPT | Performed by: EMERGENCY MEDICINE

## 2022-01-26 PROCEDURE — 84484 ASSAY OF TROPONIN QUANT: CPT

## 2022-01-26 PROCEDURE — 80053 COMPREHEN METABOLIC PANEL: CPT

## 2022-01-26 PROCEDURE — 99283 EMERGENCY DEPT VISIT LOW MDM: CPT

## 2022-01-26 PROCEDURE — 85025 COMPLETE CBC W/AUTO DIFF WBC: CPT

## 2022-01-26 RX ORDER — OMEPRAZOLE 20 MG/1
20 CAPSULE, DELAYED RELEASE ORAL
Qty: 30 CAPSULE | Refills: 0 | Status: SHIPPED | OUTPATIENT
Start: 2022-01-26

## 2022-01-26 RX ORDER — AZITHROMYCIN 250 MG/1
TABLET, FILM COATED ORAL
Qty: 6 TABLET | Refills: 0 | Status: SHIPPED | OUTPATIENT
Start: 2022-01-26 | End: 2022-02-05

## 2022-01-26 ASSESSMENT — ENCOUNTER SYMPTOMS
VOMITING: 0
PHOTOPHOBIA: 0
COUGH: 0
EYE DISCHARGE: 0
SORE THROAT: 0
CHEST TIGHTNESS: 0
ABDOMINAL PAIN: 1
ABDOMINAL DISTENTION: 0
NAUSEA: 0
WHEEZING: 0

## 2022-01-26 ASSESSMENT — PAIN SCALES - GENERAL: PAINLEVEL_OUTOF10: 0

## 2022-01-26 NOTE — ED PROVIDER NOTES
[address] [facility] ED  eMERGENCY dEPARTMENT eNCOUnter      Pt Name: Elfego Swain  MRN: [id number]  [place] 1975  Date of evaluation: 1/26/2022  Provider: Arlen Lima MD    CHIEF COMPLAINT       Chief Complaint   Patient presents with    Chest Pain     c/o  chest and right ear pain x 2 weeks   pt s atates she has this ringing in her right ear and her chest hurts and has indigestion after eating          HISTORY OF PRESENT ILLNESS   (Location/Symptom, Timing/Onset,Context/Setting, Quality, Duration, Modifying Factors, Severity)  Note limiting factors. Elfego Swain is a 55 y.o. female who presents to the emergency department complaining of multiple complaints. Patient describes right ear discomfort that is been going on for the past couple weeks. She states it started as a fluid feeling in the right ear and occasionally ringing. More recently more symptoms as she lays down with congestion and fullness to the right ear. In addition. She is complaining of right-sided chest pain and epigastric pain after eating. No related fever or chills. No nausea or vomiting. No other GI symptoms such as diarrhea or constipation. No cough  Reviewing her recent past medical history she had completed a stress test [facility] clinic 8 months ago that was normal with a normal ejection fraction. HPI    NursingNotes were reviewed. REVIEW OF SYSTEMS    (2-9 systems for level 4, 10 or more for level 5)     Review of Systems   Constitutional: Negative for chills and diaphoresis. HENT: Positive for ear pain. Negative for congestion, mouth sores and sore throat. Eyes: Negative for photophobia and discharge. Respiratory: Negative for cough, chest tightness and wheezing. Cardiovascular: Negative for chest pain and palpitations. Gastrointestinal: Positive for abdominal pain. Negative for abdominal distention, nausea and vomiting. Endocrine: Negative for cold intolerance.    Genitourinary: Negative for difficulty urinating. Musculoskeletal: Negative for arthralgias. Skin: Negative for pallor and rash. Allergic/Immunologic: Negative for immunocompromised state. Neurological: Negative for dizziness and syncope. Hematological: Negative for adenopathy. Psychiatric/Behavioral: Negative for agitation and hallucinations. All other systems reviewed and are negative. Except as noted above the remainder of the review of systems was reviewed and negative. PAST MEDICAL HISTORY     Past Medical History:   Diagnosis Date    Arthritis     Asthma     Hepatitis C     Rheumatoid arteritis ([facility] [address]          SURGICALHISTORY       Past Surgical History:   Procedure Laterality Date    HYSTERECTOMY      TUBAL LIGATION           CURRENT MEDICATIONS       Previous Medications    ALBUTEROL SULFATE  (90 BASE) MCG/ACT INHALER    Inhale 2 puffs into the lungs every 6 hours as needed    ALBUTEROL-IPRATROPIUM (COMBIVENT RESPIMAT)  MCG/ACT AERS INHALER    Inhale into the lungs    ALPRAZOLAM (XANAX) 0.25 MG TABLET    Take 0.25 mg by mouth 3 times daily as needed for Anxiety. .    ATOGEPANT (QULIPTA) 60 MG TABS    Take 60 mg by mouth daily    CLONAZEPAM (KLONOPIN) 0.5 MG TABLET    Take 0.5 mg by mouth 3 times daily as needed. .    CYCLOBENZAPRINE (FLEXERIL) 5 MG TABLET    TAKE 1 TABLET BY MOUTH EVERY 6 TO 8 HOURS AS NEEDED FOR MUSCLE SPASMS    DICLOFENAC SODIUM 1 % GEL    Apply 4 g topically 4 times daily    DULOXETINE (CYMBALTA) 30 MG EXTENDED RELEASE CAPSULE    Take 1 capsule by mouth 2 times daily Start by taking one 30 mg capsule once daily x 7 days, then twice daily thereafter    HYDROXYCHLOROQUINE (PLAQUENIL) 200 MG TABLET    Take 200 mg by mouth 2 times daily (with meals)    MIRTAZAPINE (REMERON) 45 MG TABLET    Take 1 tablet by mouth nightly    PRAZOSIN (MINIPRESS) 2 MG CAPSULE    Take 1 capsule by mouth nightly    RIMEGEPANT SULFATE (NURTEC) 75 MG TBDP    Take 75 mg by mouth as needed (TAKE AT ONSET OF MIGRAINE) 2 SAMPLES GIVEN  LOT: [id number]  EXP: 10/22       ALLERGIES     Isoniazid, Adalimumab, Buspirone, Morphine, Quetiapine, and Methotrexate    FAMILY HISTORY     No family history on file. 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    Sexual activity: Yes     Partners: Male   Other Topics Concern    Not on file   Social History Narrative    Not on file     Social Determinants of Health     Financial Resource Strain:     Difficulty of Paying Living Expenses: Not on file   Food Insecurity:     Worried About Running Out of Food in the Last Year: Not on file    [place] of Food in the Last Year: Not on file   Transportation Needs:     Lack of Transportation (Medical): Not on file    Lack of Transportation (Non-Medical):  Not on file   Physical Activity:     Days of Exercise per Week: Not on file    Minutes of Exercise per Session: Not on file   Stress:     Feeling of Stress : Not on file   Social Connections:     Frequency of Communication with Friends and Family: Not on file    Frequency of Social Gatherings with Friends and Family: Not on file    Attends Taoist Services: Not on file    Active Member of Clubs or Organizations: Not on file    Attends Club or Organization Meetings: Not on file    Marital Status: Not on file   Intimate Partner Violence:     Fear of Current or Ex-Partner: Not on file    Emotionally Abused: Not who either signs or Co-signsthis chart in the absence of a cardiologist.  EKG shows normal sinus rhythm no acute ST or T wave changes. Normal axis. Normal EKG. RADIOLOGY:   Non-plain filmimages such as CT, Ultrasound and MRI are read by the radiologist. Plain radiographic images are visualized and preliminarily interpreted by the emergency physician with the below findings:      Interpretation per the Radiologist below, if available at the time ofthis note:    XR CHEST (2 VW)   Final Result      No radiographic evidence of acute intrathoracic process. ED BEDSIDE ULTRASOUND:   Performed by ED Physician - none    LABS:  Labs Reviewed   COMPREHENSIVE METABOLIC PANEL - Abnormal; Notable for the following components:       Result Value    Potassium 5.0 (*)     Globulin 3.7 (*)     All other components within normal limits   CK   TROPONIN   CBC WITH AUTO DIFFERENTIAL       All other labs were within normal range or not returned as of this dictation. EMERGENCY DEPARTMENT COURSE and DIFFERENTIAL DIAGNOSIS/MDM:   Vitals:    Vitals:    01/26/22 0933   BP: (!) 140/89   Pulse: 85   Resp: 20   Temp: 98.2 °F (36.8 °C)   SpO2: 98%   Weight: 210 lb (95.3 kg)   Height: 5' 5\" (1.651 m)          MDM patient had a negative cardiac work-up here. She has fluid behind the right ear which is treated with oral antibiotics. She has epigastric pain with eating and I am going to treat her with Prilosec for that. Discharged home improved      CONSULTS:  None    PROCEDURES:  Unless otherwise noted below, none     Procedures    FINAL IMPRESSION      1. Acute serous otitis media, recurrence not specified, unspecified laterality    2.  Gastritis and duodenitis          DISPOSITION/PLAN   DISPOSITION Decision To Discharge 01/26/2022 12:51:53 PM      PATIENT REFERRED TO:  Family Doctor    In 3 days        DISCHARGE MEDICATIONS:  New Prescriptions    AZITHROMYCIN (ZITHROMAX) 250 MG TABLET    2 TABS DAY 1 THEN 1 TAB DAYS 2-5 OMEPRAZOLE (PRILOSEC) 20 MG DELAYED RELEASE CAPSULE    Take 1 capsule by mouth every morning (before breakfast)          (Please note that portions of this note were completed with a voice recognition program.  Efforts were made to edit the dictations but occasionally words are mis-transcribed.)    Pedro Ruvalcaba MD (electronically signed)  Attending Emergency Physician          Pedro Ruvalcaba MD  01/26/22

## 2022-01-27 PROBLEM — F31.32 BIPOLAR AFFECTIVE DISORDER, CURRENTLY DEPRESSED, MODERATE (HCC): Status: ACTIVE | Noted: 2019-10-24

## 2022-01-27 PROBLEM — F41.1 GENERALIZED ANXIETY DISORDER WITH PANIC ATTACKS: Status: ACTIVE | Noted: 2019-12-02

## 2022-01-27 PROBLEM — F41.0 GENERALIZED ANXIETY DISORDER WITH PANIC ATTACKS: Status: ACTIVE | Noted: 2019-12-02

## 2022-01-27 PROBLEM — G43.901 STATUS MIGRAINOSUS: Status: ACTIVE | Noted: 2020-12-15

## 2022-01-27 PROBLEM — F11.21 OPIOID USE DISORDER, MODERATE, IN SUSTAINED REMISSION (HCC): Status: ACTIVE | Noted: 2019-12-02

## 2022-01-27 PROBLEM — R51.9 CHRONIC DAILY HEADACHE: Status: ACTIVE | Noted: 2020-12-15

## 2022-01-27 PROBLEM — M54.50 BILATERAL LOW BACK PAIN WITHOUT SCIATICA: Status: ACTIVE | Noted: 2020-11-24

## 2022-01-27 LAB
EKG ATRIAL RATE: 76 BPM
EKG P AXIS: 12 DEGREES
EKG P-R INTERVAL: 146 MS
EKG Q-T INTERVAL: 386 MS
EKG QRS DURATION: 90 MS
EKG QTC CALCULATION (BAZETT): 434 MS
EKG R AXIS: 23 DEGREES
EKG T AXIS: 15 DEGREES
EKG VENTRICULAR RATE: 76 BPM

## 2022-01-27 PROCEDURE — 93010 ELECTROCARDIOGRAM REPORT: CPT | Performed by: INTERNAL MEDICINE

## 2022-01-27 RX ORDER — OLANZAPINE 5 MG/1
TABLET ORAL
COMMUNITY
Start: 2021-11-03

## 2022-01-28 ENCOUNTER — OFFICE VISIT (OUTPATIENT)
Dept: OBGYN CLINIC | Age: 47
End: 2022-01-28
Payer: MEDICARE

## 2022-01-28 VITALS
BODY MASS INDEX: 34.95 KG/M2 | HEART RATE: 90 BPM | WEIGHT: 210 LBS | SYSTOLIC BLOOD PRESSURE: 136 MMHG | DIASTOLIC BLOOD PRESSURE: 88 MMHG

## 2022-01-28 DIAGNOSIS — Z11.51 ENCOUNTER FOR SCREENING FOR HUMAN PAPILLOMAVIRUS (HPV): ICD-10-CM

## 2022-01-28 DIAGNOSIS — Z12.31 ENCOUNTER FOR SCREENING MAMMOGRAM FOR BREAST CANCER: Primary | ICD-10-CM

## 2022-01-28 DIAGNOSIS — Z01.419 WOMEN'S ANNUAL ROUTINE GYNECOLOGICAL EXAMINATION: ICD-10-CM

## 2022-01-28 PROCEDURE — 99386 PREV VISIT NEW AGE 40-64: CPT | Performed by: OBSTETRICS & GYNECOLOGY

## 2022-01-28 ASSESSMENT — ENCOUNTER SYMPTOMS
CONSTIPATION: 0
BACK PAIN: 0
VOMITING: 0
COUGH: 0
WHEEZING: 0
SORE THROAT: 0
ABDOMINAL PAIN: 0
VOICE CHANGE: 0
COLOR CHANGE: 0
TROUBLE SWALLOWING: 0
SHORTNESS OF BREATH: 0
NAUSEA: 0
BLOOD IN STOOL: 0
ABDOMINAL DISTENTION: 0
CHEST TIGHTNESS: 0

## 2022-01-28 NOTE — PROGRESS NOTES
CHIEF COMPLAINT: And is here for well woman exam.  She is a 42-year-old female she has had previous hysterectomy for abnormal Pap smear she has rheumatoid arthritis. She had a mammogram done at the [facility] clinic and she needs a short interval follow-up  Chief Complaint   Patient presents with    Annual Exam         HISTORY OF PRESENT ILLNESS:  55 y.o. female presents for her annual exam. She had no concernsor complaints today. Her menses is absent. She denies breakthrough bleeding, pelvic pain, or abnormal discharge. Bowel and bladder function is normal. Her health overallhas been good. Past Medical History:   Diagnosis Date    Abnormal Pap smear of cervix     [facility]    Arthritis     Asthma     Breast disorder     Depression     Hepatitis C     Rheumatoid arteritis (HCC)      Past Surgical History:   Procedure Laterality Date    HYSTERECTOMY      LUNG SURGERY      TUBAL LIGATION       Family History   Problem Relation Age of Onset    Colon Cancer Maternal Grandmother     Breast Cancer Neg Hx      Social History     Socioeconomic History    Marital status: Single     Spouse name: Not on file    Number of children: Not on file    Years of education: Not on file    Highest education level: Not on file   Occupational History    Not on file   Tobacco Use    Smoking status: Current Every Day Smoker     Packs/day: 1.00     Years: 1.00     Pack years: 1.00     Types: Cigarettes    Smokeless tobacco: Never Used   Vaping Use    Vaping Use: Never used   Substance and Sexual Activity    Alcohol use: No    Drug use: Yes     Types:  Other-see comments     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    Sexual activity: Yes     Partners: Male   Other Topics Concern    Not on file   Social History Narrative    Not on file     Social Determinants of Health     Financial Resource Strain:     Difficulty of Paying Living Expenses: Not on file   Food Insecurity:     Worried About [address] in the Last Year: Not on file    Ran Out of Food in the Last Year: Not on file   Transportation Needs:     Lack of Transportation (Medical): Not on file    Lack of Transportation (Non-Medical):  Not on file   Physical Activity:     Days of Exercise per Week: Not on file    Minutes of Exercise per Session: Not on file   Stress:     Feeling of Stress : Not on file   Social Connections:     Frequency of Communication with Friends and Family: Not on file    Frequency of Social Gatherings with Friends and Family: Not on file    Attends Sabianist Services: Not on file    Active Member of [address] [place] [address] [employer] or Organizations: Not on file    Attends Club or Organization Meetings: Not on file    Marital Status: Not on file   Intimate Partner Violence:     Fear of Current or Ex-Partner: Not on file    Emotionally Abused: Not on file    Physically Abused: Not on file    Sexually Abused: Not on file   Housing Stability:     Unable to Pay for Housing in the Last Year: Not on file    Number of [place] in the Last Year: Not on file    Unstable Housing in the Last Year: Not on file     Allergies:  Isoniazid, Adalimumab, Buspirone, Morphine, Quetiapine, and Methotrexate  Current Outpatient Medications on File Prior to Visit   Medication Sig Dispense Refill    OLANZapine (ZYPREXA) 5 MG tablet       azithromycin (ZITHROMAX) 250 MG tablet 2 TABS DAY 1 THEN 1 TAB DAYS 2-5 6 tablet 0    omeprazole (PRILOSEC) 20 MG delayed release capsule Take 1 capsule by mouth every morning (before breakfast) 30 capsule 0    albuterol sulfate  (90 Base) MCG/ACT inhaler Inhale 2 puffs into the lungs every 6 hours as needed      cyclobenzaprine (FLEXERIL) 5 MG tablet TAKE 1 TABLET BY MOUTH EVERY 6 TO 8 HOURS AS NEEDED FOR MUSCLE SPASMS      hydroxychloroquine (PLAQUENIL) 200 MG tablet Take 200 mg by mouth 2 times daily (with meals)      albuterol-ipratropium (COMBIVENT RESPIMAT)  MCG/ACT AERS inhaler Inhale into the lungs      mirtazapine (REMERON) 45 MG tablet Take 1 tablet by mouth nightly      prazosin (MINIPRESS) 2 MG capsule Take 1 capsule by mouth nightly      DULoxetine (CYMBALTA) 30 MG extended release capsule Take 1 capsule by mouth 2 times daily Start by taking one 30 mg capsule once daily x 7 days, then twice daily thereafter 60 capsule 3    Atogepant (QULIPTA) 60 MG TABS Take 60 mg by mouth daily 30 tablet 5    Rimegepant Sulfate (NURTEC) 75 MG TBDP Take 75 mg by mouth as needed (TAKE AT ONSET OF MIGRAINE) 2 SAMPLES GIVEN  LOT: [id number]  EXP: 10/22 4 tablet 0    diclofenac sodium 1 % GEL Apply 4 g topically 4 times daily 4 Tube 1    clonazePAM (KLONOPIN) 0.5 MG tablet Take 0.5 mg by mouth 3 times daily as needed.  ALPRAZolam (XANAX) 0.25 MG tablet Take 0.25 mg by mouth 3 times daily as needed for Anxiety. No current facility-administered medications on file prior to visit. OB History    No obstetric history on file. Patient's medications, allergies, past medical, surgical,social and family histories were reviewed and updated as appropriate. Review of Systems   Constitutional: Negative for activity change, appetite change, fatigue and unexpected weight change. HENT: Negative for dental problem, ear pain, hearing loss, nosebleeds, sore throat, trouble swallowing and voice change. Eyes: Negative for visual disturbance. Respiratory: Negative for cough, chest tightness, shortness of breath and wheezing. Cardiovascular: Negative for chest pain and palpitations. Gastrointestinal: Negative for abdominal distention, abdominal pain, blood in stool, constipation, nausea and vomiting. Endocrine: Negative for cold intolerance, heat intolerance, polydipsia, polyphagia and polyuria. Genitourinary: Negative for difficulty urinating, dyspareunia, dysuria, flank pain, frequency, genital sores, hematuria, menstrual problem, pelvic pain, urgency, vaginal bleeding, vaginal discharge and vaginal pain. Musculoskeletal: Negative for arthralgias, back pain, joint swelling and myalgias. Skin: Negative for color change and rash. Allergic/Immunologic: Negative for environmental allergies, food allergies and immunocompromised state. Neurological: Negative for dizziness, seizures, syncope, speech difficulty, weakness, numbness and headaches. Hematological: Negative for adenopathy. Does not bruise/bleed easily. Psychiatric/Behavioral: Negative for agitation, behavioral problems, confusion, decreased concentration, dysphoric mood and suicidal ideas. The patient is not nervous/anxious and is not hyperactive. All other systems reviewed and are negative. PHYSICAL EXAM  /88   Pulse 90   Wt 210 lb (95.3 kg)   BMI 34.95 kg/m²     Physical Exam  Vitals and nursing note reviewed. Exam conducted with a chaperone present. Constitutional:       Appearance: She is well-developed. HENT:      Head: Normocephalic and atraumatic. Eyes:      Pupils: Pupils are equal, round, and reactive to light. Neck:      Thyroid: No thyromegaly. Trachea: No tracheal deviation. Cardiovascular:      Rate and Rhythm: Normal rate and regular rhythm. Heart sounds: Normal heart sounds. Pulmonary:      Effort: Pulmonary effort is normal.      Breath sounds: Normal breath sounds. Chest:      Chest wall: No tenderness. Abdominal:      General: Bowel sounds are normal. There is no distension. Palpations: Abdomen is soft. There is no mass. Tenderness: There is no abdominal tenderness. There is no guarding or rebound. Hernia: There is no hernia in the left inguinal area. Genitourinary:     Labia:         Right: No rash, tenderness, lesion or injury. Left: No rash, tenderness, lesion or injury. Vagina: Normal. No foreign body. No vaginal discharge, erythema, tenderness or bleeding. Adnexa:         Right: No mass, tenderness or fullness.           Left: No mass, tenderness or fullness. Rectum: Normal. No mass, tenderness, anal fissure, external hemorrhoid or internal hemorrhoid. Normal anal tone. Musculoskeletal:         General: Normal range of motion. Cervical back: Normal range of motion. Lymphadenopathy:      Cervical: No cervical adenopathy. Neurological:      Mental Status: She is alert and oriented to person, place, and time. ASSESSMENT : Routine Annual Exam      Diagnosis Orders   1. Encounter for screening mammogram for breast cancer     2. Encounter for screening for human papillomavirus (HPV)     3. Women's annual routine gynecological examination         PLAN: Mammogram ordered. Patient to follow-up in 1 year  Pap smear obtained  No orders of the defined types were placed in this encounter. No orders of the defined types were placed in this encounter. Repeat Annual every 1 year. New ASCCP guidelines regarding pap and Hi Risk HPV co-testing reviewed. Cervical Cytology Evaluation begins at 24years old. If Negative Cytology, Follow-upscreening per current  ASCCP guidelines. Mammograms every 1 year. If 35 yo and last mammogram was negative. Calcium and Vitamin D dosing reviewed. Colonoscopy screening guidelines reviewed as well as onset forbone density testing. Birth control and barrier methods and recommendations discussed. STD counseling and prevention reviewed. Gardisil counseling completed for all patients 7-33 yo. Routine health maintenance per patients PCP.        Electronically signed by Ness Su DO on 1/28/22

## 2022-02-03 LAB
HPV COMMENT: NORMAL
HPV TYPE 16: NOT DETECTED
HPV TYPE 18: NOT DETECTED
HPVOH (OTHER TYPES): NOT DETECTED

## 2022-02-24 ENCOUNTER — OFFICE VISIT (OUTPATIENT)
Dept: NEUROLOGY | Age: 47
End: 2022-02-24
Payer: MEDICARE

## 2022-02-24 VITALS — BODY MASS INDEX: 35.11 KG/M2 | WEIGHT: 211 LBS

## 2022-02-24 DIAGNOSIS — G43.109 MIGRAINE WITH AURA AND WITHOUT STATUS MIGRAINOSUS, NOT INTRACTABLE: Primary | ICD-10-CM

## 2022-02-24 DIAGNOSIS — G62.89 OTHER SPECIFIED POLYNEUROPATHIES: ICD-10-CM

## 2022-02-24 DIAGNOSIS — M54.50 CHRONIC LOW BACK PAIN, UNSPECIFIED BACK PAIN LATERALITY, UNSPECIFIED WHETHER SCIATICA PRESENT: ICD-10-CM

## 2022-02-24 DIAGNOSIS — M54.10 RADICULOPATHY, UNSPECIFIED SPINAL REGION: ICD-10-CM

## 2022-02-24 DIAGNOSIS — G89.29 CHRONIC LOW BACK PAIN, UNSPECIFIED BACK PAIN LATERALITY, UNSPECIFIED WHETHER SCIATICA PRESENT: ICD-10-CM

## 2022-02-24 DIAGNOSIS — R79.89 OTHER SPECIFIED ABNORMAL FINDINGS OF BLOOD CHEMISTRY: ICD-10-CM

## 2022-02-24 PROCEDURE — 99214 OFFICE O/P EST MOD 30 MIN: CPT | Performed by: STUDENT IN AN ORGANIZED HEALTH CARE EDUCATION/TRAINING PROGRAM

## 2022-02-24 RX ORDER — METHOCARBAMOL 500 MG/1
500 TABLET, FILM COATED ORAL 3 TIMES DAILY PRN
Qty: 40 TABLET | Refills: 0 | Status: SHIPPED | OUTPATIENT
Start: 2022-02-24 | End: 2022-03-10

## 2022-02-24 RX ORDER — ATOGEPANT 60 MG/1
60 TABLET ORAL DAILY
Qty: 30 TABLET | Refills: 5 | Status: SHIPPED | OUTPATIENT
Start: 2022-02-24

## 2022-02-24 RX ORDER — SUMATRIPTAN 50 MG/1
50 TABLET, FILM COATED ORAL
Qty: 9 TABLET | Refills: 3 | Status: SHIPPED | OUTPATIENT
Start: 2022-02-24 | End: 2022-02-24 | Stop reason: CLARIF

## 2022-02-24 RX ORDER — RIMEGEPANT SULFATE 75 MG/75MG
75 TABLET, ORALLY DISINTEGRATING ORAL DAILY PRN
Qty: 8 TABLET | Refills: 3 | Status: SHIPPED | OUTPATIENT
Start: 2022-02-24

## 2022-02-24 ASSESSMENT — ENCOUNTER SYMPTOMS
CHEST TIGHTNESS: 0
ALLERGIC/IMMUNOLOGIC NEGATIVE: 1
PHOTOPHOBIA: 0
DIARRHEA: 0
TROUBLE SWALLOWING: 0
NAUSEA: 0
BACK PAIN: 1
SHORTNESS OF BREATH: 0
VOMITING: 0

## 2022-02-24 NOTE — PROGRESS NOTES
[address]  [address]  [place] [address]  [phone]     Date of Visit:  2022  Patient Name: Jens Rosa   Patient :  1975     CHIEF COMPLAINT:     Jens Rosa is a 55 y.o. female who presents today for an general visit to be evaluated for the following condition(s):  No chief complaint on file. HISTORY OF PRESENT ILLNESS     HPI  Roxanna Ovalles is her for follow up for classic migraines affecting 12 days per month. Since last visit, she was prescribed Aleatha Pore but has not started the medication yet, as she was not sure if it was approved. I do see an approval report and will send in another prescription. Patient tried the Nurtec psychologically relieved her headache. Given that the catheter has already been approved, we will continue on this, but we can consider Nurtec for preventative treatment in the future if Aleatha Pore does not work well for her. Patient reports severe pain today as she reportedly had a positive test hepatitis B or C. Patient is unsure which. Chart review shows  positive for hepatitis B core antibody suggestive of possible remote viral infection hepatitis C positive in  with spontaneous clearance without anti-retroviral treatment. Patient reports that due to positive testing, she has not received her RA infusion and is in significant pain due to this. Patient numbness, tingling, and pain at the bottoms of her feet bilaterally. Pain is intermittent patient has difficulty pinpointing situations in which pain is the worst.  Notable absence of Achilles reflex. Patient denies exertional pain while walking. Pain is not completely relieved at rest.  Pain not improved with leaning forward. This pain is not usually accompanied by radicular pain, although patient endorses significant lower back pain bilaterally. No associated foot or leg swelling. Pedal pulses intact.      Patient denies saddle anesthesia, urinary retention, bowel or bladder incontinence, imbalance, or falls. Patient started non-smoker, gabapentin, and Lyrica due to lack of efficacy. Patient previously had TB and was treated with rifampin and isoniazid. She did take supplemental B6 complex. Patient does believe that the neuropathy began on shortly after completion of this treatment regimen. Patient also has other risk factors for neuropathy including history of hepatitis C infection and RA. The patient she is reportedly clear to kidney infection and does not have any signs or symptoms of TB meningeal involvement including nuchal rigidity, fever, chills, night sweats, unintentional weight loss, cough, confusion, or seizures. Additionally, migraine headaches responded to Nurtec sample which is very suggestive of migraine headaches. 12/23/21  Trina Portillo is a 55-year-old female with a past medical history of RA, asthma, fibromyalgia, viral hepatitis C, and current smoker who presents to establish care for migraine. Patient reports that she first began having migraines at 25year old. Patient was previously in the care of Dr. Je Kirk at [facility]. Patient reports that she gets migraine headaches at a frequency of 3 times weekly. Headaches usually last all day. Headaches are accompanied by nausea, vomiting, photophobia, phonophobia, and dizziness. Headache location varies effecting the right occipital and temporal regions. Patient has aura of blurred vision. Dr. Je Kirk at [facility] had started patient on Cymbalta for preventative treatment and flexeril for acute treatment. MRI of the cervical spine without contrast was done which showed mild degenerative changes without central canal or foraminal stenosis. No mention of atlanto-axial subluxation. Patient as upcoming fundoscopic exam and is regularly monitored by ophthalmology given that she is on plaquenil.  Patient reports having a CT of the head in the past, although I do not see this in her chart or CareEverywhere. Patient also reports a feeling of numbness and tingling that occurs intermittently in her feet and calf. Not worse with exertion and relieved with rest. Reports lower back pain, but no radicular or radiating symptoms. No bowel or bladder incontinence. No relieved by bending forward. Patient denies visual field loss, transient vision loss, increasing pain with cough or sneeze, positional headache, exertional headache, fevers, chills, unintentional weight loss, or seizures. Current medications:  Cymbalta-preventative treatment  Flexeril-acute treatment    Previous medications tried and failed or contraindicated:  Cymbalta  Gabapentin  Lyrica  Propranolol-contraindicated due to asthma      REVIEW OF SYSTEM      Review of Systems   Constitutional: Negative for chills and fever. HENT: Negative for congestion and trouble swallowing. Eyes: Negative for photophobia and visual disturbance. Respiratory: Negative for chest tightness and shortness of breath. Cardiovascular: Negative for chest pain. Gastrointestinal: Negative for diarrhea, nausea and vomiting. Endocrine: Negative. Genitourinary: Negative. Musculoskeletal: Positive for arthralgias, back pain and neck pain. Negative for gait problem. Skin: Negative. Allergic/Immunologic: Negative. Neurological: Positive for headaches. Negative for dizziness, tremors, seizures, syncope, facial asymmetry, speech difficulty, weakness, light-headedness and numbness. Hematological: Negative. Psychiatric/Behavioral: Negative for hallucinations and self-injury. REVIEWED INFORMATION      Allergies   Allergen Reactions    Isoniazid Itching and Swelling    Adalimumab Other (See Comments) and Hives     BLISTERS    Buspirone      Blisters    Morphine      hives    Quetiapine Swelling     Reports not being able to swallow following taking seroquel, but used to take regularly.   Reports not being able to swallow following taking seroquel, but used to take regularly.       Methotrexate Nausea And Vomiting       Patient Active Problem List   Diagnosis    Benzodiazepine abuse ([facility] [address]    Abnormal CT scan, chest    Asthma    Pleuritic pain    Bilateral hand pain    Bone erosion determined by x-ray    Bronchitis, acute    Chronic venous embolism and thrombosis of axillary veins (HCC)    Depression    Elevated sed rate    Diarrhea    Fibromyalgia    Viral hepatitis C    Knee pain, bilateral    Knee pain, left    Lung anomaly    Multiple joint pain    Nicotine dependence, uncomplicated    Nonspecific tuberculin test reaction    Opiates and related narcotics causing adverse effect    Opioid dependence, continuous (HCC)    Pain of both elbows    Positive anti-CCP test    Rheumatoid arthritis of multiple sites without organ or system involvement with positive rheumatoid factor (HCC)    Swelling of knee joint    Synovitis of knee    Upper respiratory infection with cough and congestion    Urticaria    Vitamin D deficiency    Status migrainosus    Generalized anxiety disorder with panic attacks    Opioid use disorder, moderate, in sustained remission (HCC)    Chronic daily headache    Bipolar affective disorder, currently depressed, moderate (HCC)    Bilateral low back pain without sciatica       Past Medical History:   Diagnosis Date    Abnormal Pap smear of cervix     [facility]    Arthritis     Asthma     Breast disorder     Depression     Hepatitis C     Rheumatoid arteritis (HCC)        Past Surgical History:   Procedure Laterality Date    HYSTERECTOMY      LUNG SURGERY      TUBAL LIGATION          Social History     Socioeconomic History    Marital status: Single     Spouse name: Not on file    Number of children: Not on file    Years of education: Not on file    Highest education level: Not on file   Occupational History    Not on file   Tobacco Use    Smoking status: Current Every Day Smoker Packs/day: 1.00     Years: 1.00     Pack years: 1.00     Types: Cigarettes    Smokeless tobacco: Never Used   Vaping Use    Vaping Use: Never used   Substance and Sexual Activity    Alcohol use: No    Drug use: Yes     Types: Other-see comments     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    Sexual activity: Yes     Partners: Male   Other Topics Concern    Not on file   Social History Narrative    Not on file     Social Determinants of Health     Financial Resource Strain:     Difficulty of Paying Living Expenses: Not on file   Food Insecurity:     Worried About Running Out of Food in the Last Year: Not on file    Gerson of Food in the Last Year: Not on file   Transportation Needs:     Lack of Transportation (Medical): Not on file    Lack of Transportation (Non-Medical): Not on file   Physical Activity:     Days of Exercise per Week: Not on file    Minutes of Exercise per Session: Not on file   Stress:     Feeling of Stress : Not on file   Social Connections:     Frequency of Communication with Friends and Family: Not on file    Frequency of Social Gatherings with Friends and Family: Not on file    Attends Gnosticism Services: Not on file    Active Member of [address] [place] [address] or Organizations: Not on file    Attends Club or Organization Meetings: Not on file    Marital Status: Not on file   Intimate Partner Violence:     Fear of Current or Ex-Partner: Not on file    Emotionally Abused: Not on file    Physically Abused: Not on file    Sexually Abused: Not on file   Housing Stability:     Unable to Pay for Housing in the Last Year: Not on file    Number of [place] in the Last Year: Not on file    Unstable Housing in the Last Year: Not on file        PHYSICAL EXAM     Vitals:    02/24/22 1029   Weight: 211 lb (95.7 kg)   Neurological Examination:      Cognitive and Language: Alert and answered questions appropriately. Language was fluent including repetition and naming.  Followed simple and complex commands. Cranial Nerves: Extraocular movements were full with no diplopia or nystagmus. Facial strength was symmetric. Normal hearing grossly bilaterally. Motor: Pronator drift was absent. Right Left     Shoulder Abduction:  5 5   Elbow Extension 5 5   Elbow Flexion 5 5   Wrist Extension 5 5   Wrist Flexion 5 5        Right Left   Hip Flexion 5 5   Knee Extension 5 5   Knee Flexion 5 5   Dorsiflexion 5 5   Plantar Flexion 5 5     Tremor: absent     Tone: Normal in all four limbs    Reflexes:    Right Left   Brachioradialis 2 2   Biceps 2 2   Triceps 2 2   Patella 2 2   Ankle 0 0            Sensory: normal to light touch x 4 limbs including dorsal and plantar aspects of feet      Coordination:  Antalgic gait. Tenderness to palpation of right occiput, neck, and upper trapezius  No nuchal rigidity or focal deficits. No pain on palpation of temporal arteries  Pain and crepitus in right TMJ without clear dislocation  Failed to overcome refractory error in fundoscopic exam  Pedal pulses 2+   No edema or swelling in BLE    ASSESSMENT/PLAN   Pleasant 80-year-old female here for follow up for care for chronic classic migraines with frequency of 12 headache days per month at baseline. Patient previously in the care of Dr. Lasha Coyne at [facility]. Initially on Cymbalta for preventative and flexeril for acute treatment without adequate relief. Work up thus far has included MRI of the cervical spine without contrast which showed mild degenerative changes without central canal or foraminal stenosis. No mention of atlanto-axial subluxation. Patient has upcoming fundoscopic exam with ophthamology and is followed regularly due to being on plaquenil. Patient does not have any red flag symptoms of signs on exam, positional headache, headache with coughing, sneezing, straining, fever, unintentional weight loss, or nuchal rigidity.   She does have a history of TB and course of antibiotics was completed with follow-up, and patient reportedly no longer has TB. Patient does not have any signs or symptoms of meningeal TB. Migraine headaches responded well to Nurtec sample for consistent with migraine and not concern for secondary cause of headache. I do not think imaging is warranted at this time. At last visit, patient was prescribed Lilly Jolly which she did not start on as she was not sure if it was approved. Chart review shows that her insurance did approve this medication, and I will resend Lilly Jolly prescription. Patient did try the sample of Nurtec which was very effective at acute treatment for her migraine, and she has not had a migraine since taking that medication. If she does not respond to the Lilly Jolly, I recommend that we switch to Nurtec for preventative treatment given her good response to sample. Patient has multiple risk factors for stroke including RA, hypertension, and nicotine dependence and for these reasons I do not feel comfortable prescribing a triptan. I will instead prescribe Nurtec for acute treatment of migraines given good response. Patient's main concern today is numbness, burning, tingling, and pain which is intermittent and affecting the plantar surface of her feet. Patient has absent reflexes in her Achilles tendons bilaterally suggestive of large fiber neuropathy. No obvious weakness although exam limited by RA pain. Patient has severe lower back pain without radicular pain. No red flag findings such as saddle anesthesia, urinary retention or bowel or bladder incontinence. Patient was also treated for TB with rifampin and isoniazid. She did supplement with B6. Neuropathy started shortly after treatment regimen was completed. Patient also has history of hepatitis C and RA. Will obtain EMG of the bilateral lower extremities as well as MRI of the lumbar spine to determine whether foot paresthesias are related to lumbar stenosis versus peripheral neuropathy.   Difficult to localize given that patient did not have hypoesthesia or hyperesthesia on pinprick. No differences in sensation in medial vs lateral plantar aspect of foot. Findings most suspicious for peripheral neuropathy but cannot rule out lumbar stenosis. Will obtain B12, A1c, copper, SPEP, MRI of the lumbar spine, and EMG of the BLE. HIV testing within the past 2 years was negative. Consider more tailored lab testing based on EMG and lumbar MRI results. Patient has not had adequate relief from Cymbalta, gabapentin, and Lyrica. Recommended alternating between over-the-counter capsaicin cream and lidocaine every other day. Flexeril has not been helpful for patient's back pain, and instructed her to discontinue. We will try short trial Robaxin which is more peripheral acting to see if this provides some pain relief. Patient is aware that muscle relaxers can be sedating which not driving relieving anything that require sustained attention. To follow up in 3 months or sooner if new or worsening symptoms.    COMMUNICATION:       Electronically signed by Wendy Richards PA-C, PA-C on 2/24/2022 at 9:02 AM

## 2022-03-23 ENCOUNTER — HOSPITAL ENCOUNTER (OUTPATIENT)
Dept: NEUROLOGY | Age: 47
Discharge: HOME | End: 2022-03-23
Payer: MEDICARE

## 2022-03-23 ENCOUNTER — HOSPITAL ENCOUNTER (OUTPATIENT)
Dept: MRI IMAGING | Age: 47
Discharge: HOME | End: 2022-03-25
Payer: MEDICARE

## 2022-03-23 DIAGNOSIS — G89.29 CHRONIC LOW BACK PAIN, UNSPECIFIED BACK PAIN LATERALITY, UNSPECIFIED WHETHER SCIATICA PRESENT: ICD-10-CM

## 2022-03-23 DIAGNOSIS — G62.89 OTHER SPECIFIED POLYNEUROPATHIES: ICD-10-CM

## 2022-03-23 DIAGNOSIS — M54.50 CHRONIC LOW BACK PAIN, UNSPECIFIED BACK PAIN LATERALITY, UNSPECIFIED WHETHER SCIATICA PRESENT: ICD-10-CM

## 2022-03-23 DIAGNOSIS — M54.10 RADICULOPATHY, UNSPECIFIED SPINAL REGION: ICD-10-CM

## 2022-03-23 PROBLEM — G43.109 MIGRAINE WITH AURA AND WITHOUT STATUS MIGRAINOSUS, NOT INTRACTABLE: Status: ACTIVE | Noted: 2022-03-23

## 2022-03-23 PROCEDURE — 72148 MRI LUMBAR SPINE W/O DYE: CPT

## 2022-03-23 NOTE — PROCEDURES
[address]                      [address], [address] [facility]                             ELECTROMYOGRAM REPORT    PATIENT NAME: Jennette Alpers                       :        1975  MED REC NO:   [id number]                            ROOM:  ACCOUNT NO:   [de-identified]                           ADMIT DATE: 2022  PROVIDER:     Balbir Ballard MD    DATE OF EM2022    REASON FOR STUDY:  Neurophysiological studies are requested for the  patient's underlying numbness and burning pain in the lower extremities. The patient was treated with INH and _____ for tuberculosis and  therefore the burning pain. She also has rheumatoid arthritis, which  may be adding to some of her symptoms as well. FINDINGS:  Motor nerve conduction study of the right common peroneal  nerve shows normal peak latency, normal amplitudes, and normal  conduction velocity. Motor nerve conduction study of the left common  peroneal nerve shows normal peak latency, normal amplitudes, and normal  conduction velocity. Right tibial nerve motor nerve conduction study  shows normal amplitudes, latency, and conduction velocity. Left tibial  nerve motor nerve conduction study shows normal amplitudes, latency, and  conduction velocity. Right sural nerve conduction study shows normal  peak latency, low amplitudes, and normal conduction velocity. Left  sural nerve conduction study shows normal peak latency, low amplitudes,  and normal conduction velocity. Right superficial peroneal nerve shows  normal peak latency, normal amplitudes, and normal conduction velocity. Left superficial peroneal nerve shows normal peak latency, low  amplitudes, and decreased conduction velocity. Saphenous nerves are not  recordable. Needle electrode examination was entirely normal except she was not able  to have any units in the extensor digitorum brevis muscle due to  rheumatoid arthritis.   No active denervation is notable. F-responses are normal.  H-responses are normal.    IMPRESSION:  Normal nerve conduction studies of the lower extremities. The patient likely has a small fiber neuropathy, not uncommonly seen  with INH as well as rheumatoid arthritis. Symptomatic treatment is  recommended. Other causes of neuropathy will be ruled out. We will  follow her as an outpatient.         Andrés Cosby MD    D: 03/23/2022 11:07:21       T: 03/23/2022 11:09:36     [id number]  Job#: [id number]     Doc#: [id number]    CC:

## 2022-03-25 ENCOUNTER — OFFICE VISIT (OUTPATIENT)
Dept: NEUROLOGY | Age: 47
End: 2022-03-25
Payer: MEDICARE

## 2022-03-25 VITALS
WEIGHT: 206 LBS | BODY MASS INDEX: 34.28 KG/M2 | HEART RATE: 83 BPM | SYSTOLIC BLOOD PRESSURE: 126 MMHG | DIASTOLIC BLOOD PRESSURE: 82 MMHG

## 2022-03-25 DIAGNOSIS — R20.2 DISTAL PARESTHESIA: ICD-10-CM

## 2022-03-25 DIAGNOSIS — M54.41 CHRONIC BILATERAL LOW BACK PAIN WITH BILATERAL SCIATICA: ICD-10-CM

## 2022-03-25 DIAGNOSIS — M54.12 CERVICAL RADICULOPATHY: ICD-10-CM

## 2022-03-25 DIAGNOSIS — G62.9 SMALL FIBER NEUROPATHY: Primary | ICD-10-CM

## 2022-03-25 DIAGNOSIS — G89.29 CHRONIC BILATERAL LOW BACK PAIN WITH BILATERAL SCIATICA: ICD-10-CM

## 2022-03-25 DIAGNOSIS — G43.109 MIGRAINE WITH AURA AND WITHOUT STATUS MIGRAINOSUS, NOT INTRACTABLE: ICD-10-CM

## 2022-03-25 DIAGNOSIS — M54.42 CHRONIC BILATERAL LOW BACK PAIN WITH BILATERAL SCIATICA: ICD-10-CM

## 2022-03-25 PROCEDURE — 99215 OFFICE O/P EST HI 40 MIN: CPT | Performed by: PSYCHIATRY & NEUROLOGY

## 2022-03-25 ASSESSMENT — ENCOUNTER SYMPTOMS
SHORTNESS OF BREATH: 0
VOMITING: 0
PHOTOPHOBIA: 0
BACK PAIN: 1
NAUSEA: 0
COLOR CHANGE: 0
CHOKING: 0
TROUBLE SWALLOWING: 0

## 2022-03-25 NOTE — PROGRESS NOTES
Subjective:      Patient ID: Peggy Barragan is a 55 y.o. female who presents today for:  Chief Complaint   Patient presents with    Follow-up     Pt states that things are going okay. Pt says that she is still having problems with her legs and having a needle feeling in them and also still having back problems as well.  Results     Pt had an emg done, and also an mri for her back that she would like to go over the results of today. HPI 55 right-handed female who I have not seen in the past though we did perform an EMG nerve conduction study. Patient has significant peripheral neuropathy. She has small fiber neuropathy related to previous use of INH which is not uncommon. This may be transient or may cause chronic issues patient been tried on Cymbalta gabapentin Lyrica. Since last seen,  she had an MRI of the lumbar spine which does not show any significant issues except for marked stenosis at L5-S1. Patient also is on a rifampicin which may have contributed. Has neck issues as well and complains symptoms in her upper extremity as well. Patient has small fiber neuropathy though she has absent ankle reflexes as well. Essential medical records were reviewed since patient was not seen by me in the past and this was done prior to patient's arrival here and then a follow-up.     Past Medical History:   Diagnosis Date    Abnormal Pap smear of cervix     LAVH    Arthritis     Asthma     Breast disorder     Depression     Hepatitis C     Rheumatoid arteritis (HCC)      Past Surgical History:   Procedure Laterality Date    HYSTERECTOMY      LUNG SURGERY      TUBAL LIGATION       Social History     Socioeconomic History    Marital status:      Spouse name: Not on file    Number of children: Not on file    Years of education: Not on file    Highest education level: Not on file   Occupational History    Not on file   Tobacco Use    Smoking status: Current Every Day Smoker     Packs/day: 1.00     Years: 1.00     Pack years: 1.00     Types: Cigarettes    Smokeless tobacco: Never Used   Vaping Use    Vaping Use: Never used   Substance and Sexual Activity    Alcohol use: No    Drug use: Yes     Types: Other-see comments     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    Sexual activity: Yes     Partners: Male   Other Topics Concern    Not on file   Social History Narrative    Not on file     Social Determinants of Health     Financial Resource Strain:     Difficulty of Paying Living Expenses: Not on file   Food Insecurity:     Worried About Running Out of Food in the Last Year: Not on file    Gerson of Food in the Last Year: Not on file   Transportation Needs:     Lack of Transportation (Medical): Not on file    Lack of Transportation (Non-Medical):  Not on file   Physical Activity:     Days of Exercise per Week: Not on file    Minutes of Exercise per Session: Not on file   Stress:     Feeling of Stress : Not on file   Social Connections:     Frequency of Communication with Friends and Family: Not on file    Frequency of Social Gatherings with Friends and Family: Not on file    Attends Mandaeism Services: Not on file    Active Member of [address] [place] [address] [employer] or Organizations: Not on file    Attends Club or Organization Meetings: Not on file    Marital Status: Not on file   Intimate Partner Violence:     Fear of Current or Ex-Partner: Not on file    Emotionally Abused: Not on file    Physically Abused: Not on file    Sexually Abused: Not on file   Housing Stability:     Unable to Pay for Housing in the Last Year: Not on file    Number of [place] in the Last Year: Not on file    Unstable Housing in the Last Year: Not on file     Family History   Problem Relation Age of Onset    Colon Cancer Maternal Grandmother     Breast Cancer Neg Hx      Allergies   Allergen Reactions    Isoniazid Itching and Swelling    Adalimumab Other (See Comments) and Hives     BLISTERS    Buspirone Other (See Comments) Blisters  Blisters    Morphine Hives     hives  hives    Quetiapine Swelling     Reports not being able to swallow following taking seroquel, but used to take regularly. Reports not being able to swallow following taking seroquel, but used to take regularly.  Methotrexate Nausea And Vomiting       Current Outpatient Medications   Medication Sig Dispense Refill    Atogepant (QULIPTA) 60 MG TABS Take 60 mg by mouth daily 30 tablet 5    Rimegepant Sulfate (NURTEC) 75 MG TBDP Take 75 mg by mouth daily as needed (migraine) 8 tablet 3    OLANZapine (ZYPREXA) 5 MG tablet       omeprazole (PRILOSEC) 20 MG delayed release capsule Take 1 capsule by mouth every morning (before breakfast) 30 capsule 0    albuterol sulfate  (90 Base) MCG/ACT inhaler Inhale 2 puffs into the lungs every 6 hours as needed      hydroxychloroquine (PLAQUENIL) 200 MG tablet Take 200 mg by mouth 2 times daily (with meals)      albuterol-ipratropium (COMBIVENT RESPIMAT)  MCG/ACT AERS inhaler Inhale into the lungs      mirtazapine (REMERON) 45 MG tablet Take 1 tablet by mouth nightly      prazosin (MINIPRESS) 2 MG capsule Take 1 capsule by mouth nightly      DULoxetine (CYMBALTA) 30 MG extended release capsule Take 1 capsule by mouth 2 times daily Start by taking one 30 mg capsule once daily x 7 days, then twice daily thereafter 60 capsule 3    diclofenac sodium 1 % GEL Apply 4 g topically 4 times daily 4 Tube 1    clonazePAM (KLONOPIN) 0.5 MG tablet Take 0.5 mg by mouth 3 times daily as needed.  ALPRAZolam (XANAX) 0.25 MG tablet Take 0.25 mg by mouth 3 times daily as needed for Anxiety. No current facility-administered medications for this visit. Review of Systems   Constitutional: Negative for fever. HENT: Negative for ear pain, tinnitus and trouble swallowing. Eyes: Negative for photophobia and visual disturbance. Respiratory: Negative for choking and shortness of breath. Cardiovascular: Negative for chest pain and palpitations. Gastrointestinal: Negative for nausea and vomiting. Musculoskeletal: Positive for back pain. Negative for gait problem, joint swelling, myalgias, neck pain and neck stiffness. Skin: Negative for color change. Allergic/Immunologic: Negative for food allergies. Neurological: Positive for weakness and numbness. Negative for dizziness, tremors, seizures, syncope, facial asymmetry, speech difficulty, light-headedness and headaches. Psychiatric/Behavioral: Negative for behavioral problems, confusion, hallucinations and sleep disturbance. Objective:   /82 (Site: Left Upper Arm, Position: Sitting, Cuff Size: Medium Adult)   Pulse 83   Wt 206 lb (93.4 kg)   BMI 34.28 kg/m²     Physical Exam  Vitals reviewed. Eyes:      Pupils: Pupils are equal, round, and reactive to light. Cardiovascular:      Rate and Rhythm: Normal rate and regular rhythm. Heart sounds: No murmur heard. Pulmonary:      Effort: Pulmonary effort is normal.      Breath sounds: Normal breath sounds. Abdominal:      General: Bowel sounds are normal.   Musculoskeletal:         General: Normal range of motion. Cervical back: Normal range of motion. Skin:     General: Skin is warm. Neurological:      Mental Status: She is alert and oriented to person, place, and time. Cranial Nerves: No cranial nerve deficit. Sensory: No sensory deficit. Motor: No abnormal muscle tone. Coordination: Coordination normal.      Deep Tendon Reflexes: Reflexes are normal and symmetric. Babinski sign absent on the right side. Babinski sign absent on the left side. Psychiatric:         Mood and Affect: Mood normal.       Absent ankle reflexes notable.     MRI LUMBAR SPINE WO CONTRAST    Result Date: 3/23/2022  Patient MRN: [id number] : 1975 Age:  55 years Gender: Female Order Date: 3/23/2022 11:22 AM Exam: MRI LUMBAR SPINE WO CONTRAST Number of Images: views Indication:  M54.50 Chronic low back pain, unspecified back pain laterality, unspecified whether sciatica present ICD10 Comparison: None. Findings: The vertebral bodies are of normal height and alignment with normal bone marrow signal intensity. The intervertebral disc at T12-L1 is hydrated and maintained. The disc at the level of L1-2 is dehydrated slightly narrowed. The disc at the level of L2-3 is well maintained and hydrated. The level the disc at the level of L3-4 and L4-5 are maintained but dehydrated. At the level of L5-S1 the disc is narrowed and dehydrated with central protrusion no spinal stenosis is seen. The paravertebral muscles are of normal signal intensity. Moderate hypertrophic changes seen of the apophyseal joints particularly at the level of L4-5 and L5-S1. Impression:  Mildly dehydrated disc particularly at L3-4 L4-5 with marked narrowing at L5-S1 associated with central protrusion Hypertrophic changes of the apophyseal joints at L4-5 and L5-S1.        Lab Results   Component Value Date    WBC 7.5 01/26/2022    RBC 4.69 01/26/2022    HGB 13.3 01/26/2022    HCT 38.6 01/26/2022    MCV 82.4 01/26/2022    MCH 28.3 01/26/2022    MCHC 34.4 01/26/2022    RDW 13.4 01/26/2022     01/26/2022    MPV 8.1 08/14/2015     Lab Results   Component Value Date     01/26/2022    K 5.0 01/26/2022    K 3.6 09/24/2019     01/26/2022    CO2 28 01/26/2022    BUN 10 01/26/2022    CREATININE 0.68 01/26/2022    GFRAA >60.0 01/26/2022    LABGLOM >60.0 01/26/2022    GLUCOSE 92 01/26/2022    PROT 7.4 01/26/2022    LABALBU 3.7 01/26/2022    CALCIUM 9.3 01/26/2022    BILITOT 0.3 01/26/2022    ALKPHOS 111 01/26/2022    AST 14 01/26/2022    ALT 14 01/26/2022     No results found for: PROTIME, INR  Lab Results   Component Value Date    TSH 0.589 05/24/2014    QGDCAJQE24 231 05/24/2014    FOLATE 5.5 05/24/2014    FERRITIN 583.6 05/24/2014    IRON 15 05/24/2014    TIBC 192 05/24/2014     No results found for: TRIG, HDL, LDLCALC, LDLDIRECT, LABVLDL  Lab Results   Component Value Date    LABAMPH Neg 08/12/2018    BARBSCNU Neg 08/12/2018    LABBENZ POSITIVE 08/12/2018    OPIATESCREENURINE Neg 08/12/2018    PHENCYCLIDINESCREENURINE Neg 08/12/2018    ETOH <10 08/12/2018     No results found for: LITHIUM, DILFRTOT, VALPROATE    Assessment:       Diagnosis Orders   1. Small fiber neuropathy     2. Cervical radiculopathy  MRI CERVICAL SPINE WO CONTRAST   3. Migraine with aura and without status migrainosus, not intractable     4. Chronic bilateral low back pain with bilateral sciatica     5. Distal paresthesia       Small fiber neuropathy. This is my first encounter with the patient as I have not seen her in the past.  She presented with 1-1/2 years history of paresthesias in the extremities. As noted she was treated with INH and rifampicin for tuberculosis for 6 months. She also has rheumatoid arthritis. We further obtain an EMG which is essentially normal as this is a small fiber neuropathy. She has paresthesias and burning pain and failed Cymbalta gabapentin and Lyrica. Her other profiles for neuropathic pain have all been negative. She also has sciatica with L5-S1 foraminal stenosis though not explaining all her symptoms. She is complaining of upper extremity as well. She does not have any motor findings though she has no ankle reflexes. Her headaches are not as bothersome. She is on [place]  For the same. I recommended a cervical spine MRI given that she has rheumatoid arthritis and make sure there is no pannus or compression. For symptomatic treatment recommended alpha lipoic acid. The outcomes of such small fiber neuropathy have a difficult to certain if these are secondary to treatments.     This is an extended relation as I have not seen the patient in the past and chart reviews were done prior to patient's arrival and then a clinical evaluation with complexity as above is a total time of 40 minutes     Plan:      Orders Placed This Encounter   Procedures    MRI CERVICAL SPINE WO CONTRAST     Standing Status:   Future     Standing Expiration Date:   3/25/2023     Order Specific Question:   Reason for exam:     Answer:   radiculopathy/ r/o pannus     No orders of the defined types were placed in this encounter. No follow-ups on file.       Graeme Green MD

## 2022-03-28 PROCEDURE — 95886 MUSC TEST DONE W/N TEST COMP: CPT | Performed by: PSYCHIATRY & NEUROLOGY

## 2022-03-28 PROCEDURE — 95912 NRV CNDJ TEST 11-12 STUDIES: CPT | Performed by: PSYCHIATRY & NEUROLOGY

## 2022-07-20 PROBLEM — M54.12 CERVICAL RADICULOPATHY: Status: ACTIVE | Noted: 2022-03-23

## 2022-07-20 PROBLEM — M54.42 CHRONIC BILATERAL LOW BACK PAIN WITH BILATERAL SCIATICA: Status: ACTIVE | Noted: 2022-03-23

## 2022-07-20 PROBLEM — M54.41 CHRONIC BILATERAL LOW BACK PAIN WITH BILATERAL SCIATICA: Status: ACTIVE | Noted: 2022-03-23

## 2022-11-05 ENCOUNTER — APPOINTMENT (OUTPATIENT)
Dept: CT IMAGING | Age: 47
End: 2022-11-05
Payer: MEDICARE

## 2022-11-05 ENCOUNTER — HOSPITAL ENCOUNTER (EMERGENCY)
Age: 47
Discharge: HOME | End: 2022-11-05
Payer: MEDICARE

## 2022-11-05 VITALS
BODY MASS INDEX: 32.32 KG/M2 | TEMPERATURE: 98.5 F | WEIGHT: 194 LBS | DIASTOLIC BLOOD PRESSURE: 86 MMHG | HEIGHT: 65 IN | RESPIRATION RATE: 16 BRPM | SYSTOLIC BLOOD PRESSURE: 118 MMHG | OXYGEN SATURATION: 97 % | HEART RATE: 63 BPM

## 2022-11-05 DIAGNOSIS — M54.6 ACUTE LEFT-SIDED THORACIC BACK PAIN: ICD-10-CM

## 2022-11-05 DIAGNOSIS — N28.1 RENAL CYST: ICD-10-CM

## 2022-11-05 DIAGNOSIS — J18.9 PNEUMONIA OF LEFT LOWER LOBE DUE TO INFECTIOUS ORGANISM: Primary | ICD-10-CM

## 2022-11-05 LAB
ALBUMIN SERPL-MCNC: 3.9 G/DL (ref 3.5–4.6)
ALP BLD-CCNC: 108 U/L (ref 40–130)
ALT SERPL-CCNC: 20 U/L (ref 0–33)
ANION GAP SERPL CALCULATED.3IONS-SCNC: 9 MEQ/L (ref 9–15)
AST SERPL-CCNC: 19 U/L (ref 0–35)
BASOPHILS ABSOLUTE: 0 K/UL (ref 0–0.2)
BASOPHILS RELATIVE PERCENT: 0.6 %
BILIRUB SERPL-MCNC: <0.2 MG/DL (ref 0.2–0.7)
BILIRUBIN URINE: NEGATIVE
BLOOD, URINE: NEGATIVE
BUN BLDV-MCNC: 12 MG/DL (ref 6–20)
CALCIUM SERPL-MCNC: 9 MG/DL (ref 8.5–9.9)
CHLORIDE BLD-SCNC: 104 MEQ/L (ref 95–107)
CLARITY: CLEAR
CO2: 27 MEQ/L (ref 20–31)
COLOR: YELLOW
CREAT SERPL-MCNC: 0.77 MG/DL (ref 0.5–0.9)
EOSINOPHILS ABSOLUTE: 0.2 K/UL (ref 0–0.7)
EOSINOPHILS RELATIVE PERCENT: 2.4 %
GFR SERPL CREATININE-BSD FRML MDRD: >60 ML/MIN/{1.73_M2}
GLOBULIN: 3.8 G/DL (ref 2.3–3.5)
GLUCOSE BLD-MCNC: 92 MG/DL (ref 70–99)
GLUCOSE URINE: NEGATIVE MG/DL
HCT VFR BLD CALC: 40 % (ref 37–47)
HEMOGLOBIN: 13.3 G/DL (ref 12–16)
KETONES, URINE: NEGATIVE MG/DL
LACTIC ACID: 0.9 MMOL/L (ref 0.5–2.2)
LEUKOCYTE ESTERASE, URINE: NEGATIVE
LIPASE: 22 U/L (ref 12–95)
LYMPHOCYTES ABSOLUTE: 1.6 K/UL (ref 1–4.8)
LYMPHOCYTES RELATIVE PERCENT: 20 %
MCH RBC QN AUTO: 28.1 PG (ref 27–31.3)
MCHC RBC AUTO-ENTMCNC: 33.2 % (ref 33–37)
MCV RBC AUTO: 84.6 FL (ref 79.4–94.8)
MONOCYTES ABSOLUTE: 0.5 K/UL (ref 0.2–0.8)
MONOCYTES RELATIVE PERCENT: 5.6 %
NEUTROPHILS ABSOLUTE: 5.8 K/UL (ref 1.4–6.5)
NEUTROPHILS RELATIVE PERCENT: 71.4 %
NITRITE, URINE: NEGATIVE
PDW BLD-RTO: 13.4 % (ref 11.5–14.5)
PH UA: 7 (ref 5–9)
PLATELET # BLD: 286 K/UL (ref 130–400)
POC CREATININE WHOLE BLOOD: 0.7
POTASSIUM SERPL-SCNC: 4.6 MEQ/L (ref 3.4–4.9)
PROTEIN UA: NEGATIVE MG/DL
RBC # BLD: 4.72 M/UL (ref 4.2–5.4)
SODIUM BLD-SCNC: 140 MEQ/L (ref 135–144)
SPECIFIC GRAVITY UA: 1.02 (ref 1–1.03)
TOTAL PROTEIN: 7.7 G/DL (ref 6.3–8)
TROPONIN: <0.01 NG/ML (ref 0–0.01)
URINE REFLEX TO CULTURE: NORMAL
UROBILINOGEN, URINE: 1 E.U./DL
WBC # BLD: 8.2 K/UL (ref 4.8–10.8)

## 2022-11-05 PROCEDURE — 6360000002 HC RX W HCPCS: Performed by: PHYSICIAN ASSISTANT

## 2022-11-05 PROCEDURE — 83605 ASSAY OF LACTIC ACID: CPT

## 2022-11-05 PROCEDURE — 36415 COLL VENOUS BLD VENIPUNCTURE: CPT

## 2022-11-05 PROCEDURE — 74177 CT ABD & PELVIS W/CONTRAST: CPT

## 2022-11-05 PROCEDURE — 85025 COMPLETE CBC W/AUTO DIFF WBC: CPT

## 2022-11-05 PROCEDURE — 6360000004 HC RX CONTRAST MEDICATION: Performed by: PHYSICIAN ASSISTANT

## 2022-11-05 PROCEDURE — 80053 COMPREHEN METABOLIC PANEL: CPT

## 2022-11-05 PROCEDURE — 99285 EMERGENCY DEPT VISIT HI MDM: CPT

## 2022-11-05 PROCEDURE — 84484 ASSAY OF TROPONIN QUANT: CPT

## 2022-11-05 PROCEDURE — 96374 THER/PROPH/DIAG INJ IV PUSH: CPT

## 2022-11-05 PROCEDURE — 93005 ELECTROCARDIOGRAM TRACING: CPT | Performed by: PHYSICIAN ASSISTANT

## 2022-11-05 PROCEDURE — 83690 ASSAY OF LIPASE: CPT

## 2022-11-05 PROCEDURE — 71275 CT ANGIOGRAPHY CHEST: CPT

## 2022-11-05 PROCEDURE — 96375 TX/PRO/DX INJ NEW DRUG ADDON: CPT

## 2022-11-05 PROCEDURE — 2580000003 HC RX 258: Performed by: PHYSICIAN ASSISTANT

## 2022-11-05 PROCEDURE — 81003 URINALYSIS AUTO W/O SCOPE: CPT

## 2022-11-05 RX ORDER — ONDANSETRON 2 MG/ML
4 INJECTION INTRAMUSCULAR; INTRAVENOUS ONCE
Status: COMPLETED | OUTPATIENT
Start: 2022-11-05 | End: 2022-11-05

## 2022-11-05 RX ORDER — KETOROLAC TROMETHAMINE 15 MG/ML
15 INJECTION, SOLUTION INTRAMUSCULAR; INTRAVENOUS ONCE
Status: COMPLETED | OUTPATIENT
Start: 2022-11-05 | End: 2022-11-05

## 2022-11-05 RX ORDER — DOXYCYCLINE HYCLATE 100 MG
100 TABLET ORAL 2 TIMES DAILY
Qty: 20 TABLET | Refills: 0 | Status: SHIPPED | OUTPATIENT
Start: 2022-11-05 | End: 2022-11-15

## 2022-11-05 RX ORDER — HYDROCODONE BITARTRATE AND ACETAMINOPHEN 5; 325 MG/1; MG/1
1 TABLET ORAL EVERY 8 HOURS PRN
Qty: 9 TABLET | Refills: 0 | Status: SHIPPED | OUTPATIENT
Start: 2022-11-05 | End: 2022-11-08

## 2022-11-05 RX ORDER — 0.9 % SODIUM CHLORIDE 0.9 %
1000 INTRAVENOUS SOLUTION INTRAVENOUS ONCE
Status: COMPLETED | OUTPATIENT
Start: 2022-11-05 | End: 2022-11-05

## 2022-11-05 RX ORDER — FENTANYL CITRATE 50 UG/ML
50 INJECTION, SOLUTION INTRAMUSCULAR; INTRAVENOUS ONCE
Status: COMPLETED | OUTPATIENT
Start: 2022-11-05 | End: 2022-11-05

## 2022-11-05 RX ADMIN — SODIUM CHLORIDE 1000 ML: 9 INJECTION, SOLUTION INTRAVENOUS at 06:53

## 2022-11-05 RX ADMIN — FENTANYL CITRATE 50 MCG: 50 INJECTION, SOLUTION INTRAMUSCULAR; INTRAVENOUS at 06:58

## 2022-11-05 RX ADMIN — ONDANSETRON 4 MG: 2 INJECTION INTRAMUSCULAR; INTRAVENOUS at 06:54

## 2022-11-05 RX ADMIN — IOPAMIDOL 75 ML: 612 INJECTION, SOLUTION INTRAVENOUS at 07:36

## 2022-11-05 RX ADMIN — KETOROLAC TROMETHAMINE 15 MG: 15 INJECTION, SOLUTION INTRAMUSCULAR; INTRAVENOUS at 06:55

## 2022-11-05 ASSESSMENT — PAIN DESCRIPTION - LOCATION
LOCATION: FLANK
LOCATION: CHEST;BACK;FLANK

## 2022-11-05 ASSESSMENT — ENCOUNTER SYMPTOMS
DIARRHEA: 0
SORE THROAT: 0
EYE PAIN: 0
BACK PAIN: 1
ABDOMINAL PAIN: 1
NAUSEA: 0
VOMITING: 0
RHINORRHEA: 0
SHORTNESS OF BREATH: 0
PHOTOPHOBIA: 0
COUGH: 0

## 2022-11-05 ASSESSMENT — PAIN - FUNCTIONAL ASSESSMENT: PAIN_FUNCTIONAL_ASSESSMENT: 0-10

## 2022-11-05 ASSESSMENT — PAIN SCALES - GENERAL
PAINLEVEL_OUTOF10: 7
PAINLEVEL_OUTOF10: 10

## 2022-11-05 ASSESSMENT — PAIN DESCRIPTION - DESCRIPTORS: DESCRIPTORS: SHARP;STABBING;ACHING

## 2022-11-05 ASSESSMENT — PAIN DESCRIPTION - ORIENTATION
ORIENTATION: LEFT
ORIENTATION: LEFT

## 2022-11-05 NOTE — ED PROVIDER NOTES
[facility] ED  eMERGENCY dEPARTMENTeNCOUnter      Pt Name: Nuno Brannon  MRN: [id number]  Armsvalentegfalicia 1975  Date ofevaluation: 11/5/2022  Provider: Yoseph Willett PA-C    CHIEF COMPLAINT       Chief Complaint   Patient presents with    Flank Pain         HISTORY OF PRESENT ILLNESS   (Location/Symptom, Timing/Onset,Context/Setting, Quality, Duration, Modifying Factors, Severity)  Note limiting factors. Nuno Brannon is a 52 y.o. female who presents to the emergency department left sided back pain, abdominal pain and chest pain that started yesterday while eating a reeses. She does have a h/o RA and has a lot of pain at baseline. Otc meds are not working. Pain is made worse by her \"smokers cough\" she has a dry nonproductive cough pain with taking a deep breath. She denies any fevers. Denies n/v/d. HPI    NursingNotes were reviewed. REVIEW OF SYSTEMS    (2-9 systems for level 4, 10 or more for level 5)     Review of Systems   Constitutional:  Negative for chills, diaphoresis, fatigue and fever. HENT:  Negative for congestion, rhinorrhea and sore throat. Eyes:  Negative for photophobia and pain. Respiratory:  Negative for cough and shortness of breath. Cardiovascular:  Positive for chest pain. Negative for palpitations. Gastrointestinal:  Positive for abdominal pain. Negative for diarrhea, nausea and vomiting. Genitourinary:  Positive for flank pain. Negative for dysuria. Musculoskeletal:  Positive for back pain. Skin:  Negative for rash. Neurological:  Negative for dizziness, light-headedness and headaches. Psychiatric/Behavioral: Negative. All other systems reviewed and are negative. Except as noted above the remainder of the review of systems was reviewed and negative.        PAST MEDICAL HISTORY     Past Medical History:   Diagnosis Date    Abnormal Pap smear of cervix     LAVH    Arthritis     Asthma     Breast disorder     Depression     Hepatitis C     Rheumatoid arteritis ([facility] [address]          SURGICALHISTORY       Past Surgical History:   Procedure Laterality Date    HYSTERECTOMY (CERVIX STATUS UNKNOWN)      LUNG SURGERY      TUBAL LIGATION           CURRENT MEDICATIONS       Discharge Medication List as of 11/5/2022  8:54 AM        CONTINUE these medications which have NOT CHANGED    Details   Atogepant (QULIPTA) 60 MG TABS Take 60 mg by mouth daily, Disp-30 tablet, R-5Normal      Rimegepant Sulfate (NURTEC) 75 MG TBDP Take 75 mg by mouth daily as needed (migraine), Disp-8 tablet, R-3Normal      OLANZapine (ZYPREXA) 5 MG tablet Historical Med      omeprazole (PRILOSEC) 20 MG delayed release capsule Take 1 capsule by mouth every morning (before breakfast), Disp-30 capsule, R-0Print      albuterol sulfate  (90 Base) MCG/ACT inhaler Inhale 2 puffs into the lungs every 6 hours as neededHistorical Med      hydroxychloroquine (PLAQUENIL) 200 MG tablet Take 200 mg by mouth 2 times daily (with meals)Historical Med      albuterol-ipratropium (COMBIVENT RESPIMAT)  MCG/ACT AERS inhaler Inhale into the lungsHistorical Med      mirtazapine (REMERON) 45 MG tablet Take 1 tablet by mouth nightlyHistorical Med      prazosin (MINIPRESS) 2 MG capsule Take 1 capsule by mouth nightlyHistorical Med      DULoxetine (CYMBALTA) 30 MG extended release capsule Take 1 capsule by mouth 2 times daily Start by taking one 30 mg capsule once daily x 7 days, then twice daily thereafter, Disp-60 capsule, R-3Normal      diclofenac sodium 1 % GEL Apply 4 g topically 4 times daily, Topical, 4 TIMES DAILY Starting Tue 8/27/2019, Disp-4 Tube, R-1, Print      clonazePAM (KLONOPIN) 0.5 MG tablet Take 0.5 mg by mouth 3 times daily as needed. Historical Med      ALPRAZolam (XANAX) 0.25 MG tablet Take 0.25 mg by mouth 3 times daily as needed for Anxiety.  Historical Med             ALLERGIES     Isoniazid, Adalimumab, Buspirone, Morphine, Quetiapine, and Methotrexate    FAMILY HISTORY       Family History Problem Relation Age of Onset    Colon Cancer Maternal Grandmother     Breast Cancer Neg Hx           SOCIAL HISTORY       Social History     Socioeconomic History    Marital status:      Spouse name: None    Number of children: None    Years of education: None    Highest education level: None   Tobacco Use    Smoking status: Every Day     Packs/day: 1.00     Years: 1.00     Pack years: 1.00     Types: Cigarettes    Smokeless tobacco: Never   Vaping Use    Vaping Use: Never used   Substance and Sexual Activity    Alcohol use: No    Drug use: Yes     Types: Other-see comments     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 Constitutional:       General: She is not in acute distress. Appearance: Normal appearance. She is well-developed. She is not diaphoretic. HENT:      Head: Normocephalic and atraumatic. Nose: Nose normal.      Mouth/Throat:      Mouth: Mucous membranes are moist.   Eyes:      General: Lids are normal.      Conjunctiva/sclera: Conjunctivae normal.   Cardiovascular:      Rate and Rhythm: Normal rate and regular rhythm. Pulses: Normal pulses. Heart sounds: Normal heart sounds. Pulmonary:      Effort: Pulmonary effort is normal.      Breath sounds: Normal breath sounds. Chest:      Chest wall: Tenderness present. No swelling, crepitus or edema. There is no dullness to percussion. Abdominal:      General: Bowel sounds are normal.      Palpations: Abdomen is soft. Tenderness: There is abdominal tenderness in the left upper quadrant and left lower quadrant. Musculoskeletal:         General: Normal range of motion. Arms:       Cervical back: Normal range of motion and neck supple. Lymphadenopathy:      Cervical: No cervical adenopathy. Skin:     General: Skin is warm and dry. Capillary Refill: Capillary refill takes less than 2 seconds. Findings: No rash. Neurological:      Mental Status: She is alert and oriented to person, place, and time. Psychiatric:         Thought Content: Thought content normal.         Judgment: Judgment normal.       DIAGNOSTIC RESULTS     EKG: All EKG's are interpreted by the Emergency Department Physician who either signs or Co-signsthis chart in the absence of a cardiologist.    Nsr 76bpm no acute st changes no ectopy     RADIOLOGY:   Non-plain filmimages such as CT, Ultrasound and MRI are read by the radiologist. Plain radiographic images are visualized and preliminarily interpreted by the emergency physician with the below findings:        Interpretation per the Radiologist below, if available at the time ofthis note:    CT ABDOMEN PELVIS W IV CONTRAST Additional Contrast? None   Final Result   1. There is no acute intra-abdominal pathology. Specifically, there is no   inflammatory process, free air or abscess   2. 5 cm x 5 cm low attenuated focus extending from the inferior pole of the   left kidney favored to represent a cyst possibly complex. Please note the   images from the patient's previous CT scan of 09/24/2019 are not available   for review. I have reviewed the patient's previous report. At the time of   the prior study the left renal cyst only measured 2.2 cm. Given the complex   appearance of the cystic lesion dedicated follow-up ultrasound examination of   the left kidney is recommended for further evaluation. 3. Left lung base atelectasis and left lung base consolidation.   I cannot   exclude pneumonia within the posterior basal segment of the left lower lobe   4. Right lung base atelectasis. CTA CHEST W WO CONTRAST PE Eval   Final Result   1. There is no pulmonary embolus and there is no thoracic aortic aneurysm or   dissection   2. Linear density seen within the right middle lobe and right lower lobe   favored to represent linear atelectasis. 3. Linear densities within the left lower lobe as well as consolidation seen   within the left lung base posteriorly. These findings are most consistent   with atelectasis. I cannot exclude superimposed pneumonia within the   posterior basal segment of the left lower lobe. 4. There is no pneumothorax. ED BEDSIDE ULTRASOUND:   Performed by ED Physician - none    LABS:  Labs Reviewed   COMPREHENSIVE METABOLIC PANEL - Abnormal; Notable for the following components:       Result Value    Globulin 3.8 (*)     All other components within normal limits   POCT CREATININE - Normal   URINALYSIS WITH REFLEX TO CULTURE   CBC WITH AUTO DIFFERENTIAL   LIPASE   LACTIC ACID   TROPONIN       All other labs were within normal range or not returned as of this dictation. EMERGENCY DEPARTMENT COURSE and DIFFERENTIAL DIAGNOSIS/MDM:   Vitals:    Vitals:    11/05/22 0557 11/05/22 0800   BP: (!) 138/97 118/86   Pulse: 85 63   Resp: 20 16   Temp: 98.5 °F (36.9 °C)    SpO2: 97%    Weight: 194 lb (88 kg)    Height: 5' 5\" (1.651 m)            MDM    Patient presents to the emergency department with back pain left-sided lower chest pain abdominal pain with a cough that is been ongoing for 2 days. Labs are grossly unremarkable. She was given analgesic and pain has significantly improved. CTs show possible an early pneumonia that cannot be ruled out. We will treat due to cough. There is no pulmonary embolus. Incidental finding of renal cyst recommending follow-up with an ultrasound follow-up with the family doctor for referral for this.   Advised to return to the ED for any new worse or concerning symptoms. Started on doxycycline and pain medicine. Patient stable for discharge. REASSESSMENT          CRITICAL CARE TIME   Total Critical Care time was  minutes, excluding separatelyreportable procedures. There was a high probability ofclinically significant/life threatening deterioration in the patient's condition which required my urgent intervention. CONSULTS:  None    PROCEDURES:  Unless otherwise noted below, none     Procedures    FINAL IMPRESSION      1. Pneumonia of left lower lobe due to infectious organism    2. Acute left-sided thoracic back pain    3. Renal cyst          DISPOSITION/PLAN   DISPOSITION Decision To Discharge 11/05/2022 08:49:40 AM      PATIENT REFERREDTO:  MD Brooke [address] [facility] [id number]  [phone]    Schedule an appointment as soon as possible for a visit in 2 days      DISCHARGEMEDICATIONS:  Discharge Medication List as of 11/5/2022  8:54 AM        START taking these medications    Details   doxycycline hyclate (VIBRA-TABS) 100 MG tablet Take 1 tablet by mouth 2 times daily for 10 days, Disp-20 tablet, R-0Normal      HYDROcodone-acetaminophen (NORCO) 5-325 MG per tablet Take 1 tablet by mouth every 8 hours as needed for Pain for up to 3 days. Intended supply: 3 days.  Take lowest dose possible to manage pain, Disp-9 tablet, R-0Normal                (Please note that portions of this note were completed with a voice recognition program.  Efforts were made to edit the dictations but occasionally words are mis-transcribed.)    Fiordaliza Maharaj PA-C (electronically signed)  Attending Emergency Physician         Fiordaliza Maharaj PA-C  11/05/22 0076

## 2022-11-05 NOTE — ED NOTES
PATIENT PRESENTS TO THE ER WITH COMPLAINTS OF LEFT SIDED FLANK PAIN   UNKNOWN IF IT IS KIDNEY OR RIB PAIN   COMPLAINS THAT IT HURTS EVERYTIME SHE TAKES A BREATH   COMPLAINS OF COUGH BUT IS UNABLE TO COUGH DUE TO PAIN   COMPLAINS OF CHILLS        Maci Hill RN  11/05/22 [id number]

## 2022-11-06 LAB
GFR SERPL CREATININE-BSD FRML MDRD: >60 ML/MIN/{1.73_M2}
PERFORMED ON: NORMAL
POC CREATININE: 0.7 MG/DL (ref 0.6–1.2)
POC SAMPLE TYPE: NORMAL

## 2022-11-07 LAB
EKG ATRIAL RATE: 76 BPM
EKG P AXIS: 11 DEGREES
EKG P-R INTERVAL: 152 MS
EKG Q-T INTERVAL: 372 MS
EKG QRS DURATION: 96 MS
EKG QTC CALCULATION (BAZETT): 418 MS
EKG R AXIS: 11 DEGREES
EKG T AXIS: 9 DEGREES
EKG VENTRICULAR RATE: 76 BPM

## 2022-11-07 PROCEDURE — 93010 ELECTROCARDIOGRAM REPORT: CPT | Performed by: INTERNAL MEDICINE

## 2023-02-09 ENCOUNTER — HOSPITAL ENCOUNTER (EMERGENCY)
Age: 48
Discharge: HOME | End: 2023-02-09
Payer: MEDICARE

## 2023-02-09 VITALS
SYSTOLIC BLOOD PRESSURE: 133 MMHG | DIASTOLIC BLOOD PRESSURE: 89 MMHG | RESPIRATION RATE: 18 BRPM | HEART RATE: 82 BPM | WEIGHT: 198 LBS | BODY MASS INDEX: 32.99 KG/M2 | OXYGEN SATURATION: 97 % | TEMPERATURE: 98.3 F | HEIGHT: 65 IN

## 2023-02-09 DIAGNOSIS — H65.91 RIGHT NON-SUPPURATIVE OTITIS MEDIA: Primary | ICD-10-CM

## 2023-02-09 PROCEDURE — 6370000000 HC RX 637 (ALT 250 FOR IP): Performed by: PHYSICIAN ASSISTANT

## 2023-02-09 PROCEDURE — 99283 EMERGENCY DEPT VISIT LOW MDM: CPT | Performed by: PHYSICIAN ASSISTANT

## 2023-02-09 RX ORDER — IBUPROFEN 800 MG/1
800 TABLET ORAL EVERY 8 HOURS PRN
Qty: 15 TABLET | Refills: 0 | Status: SHIPPED | OUTPATIENT
Start: 2023-02-09

## 2023-02-09 RX ORDER — AMOXICILLIN 500 MG/1
500 CAPSULE ORAL 2 TIMES DAILY
Qty: 14 CAPSULE | Refills: 0 | Status: SHIPPED | OUTPATIENT
Start: 2023-02-09 | End: 2023-02-16

## 2023-02-09 RX ORDER — IBUPROFEN 800 MG/1
800 TABLET ORAL ONCE
Status: COMPLETED | OUTPATIENT
Start: 2023-02-09 | End: 2023-02-09

## 2023-02-09 RX ADMIN — IBUPROFEN 800 MG: 800 TABLET, FILM COATED ORAL at 06:36

## 2023-02-09 ASSESSMENT — ENCOUNTER SYMPTOMS
ABDOMINAL DISTENTION: 0
COLOR CHANGE: 0
SHORTNESS OF BREATH: 0
ABDOMINAL PAIN: 0
SORE THROAT: 0
CONSTIPATION: 0
EYE DISCHARGE: 0
RHINORRHEA: 0

## 2023-02-09 ASSESSMENT — PAIN SCALES - GENERAL
PAINLEVEL_OUTOF10: 6
PAINLEVEL_OUTOF10: 10

## 2023-02-09 ASSESSMENT — PAIN DESCRIPTION - LOCATION: LOCATION: EAR;TEETH

## 2023-02-09 ASSESSMENT — PAIN DESCRIPTION - DESCRIPTORS: DESCRIPTORS: THROBBING;SHARP

## 2023-02-09 ASSESSMENT — PAIN - FUNCTIONAL ASSESSMENT: PAIN_FUNCTIONAL_ASSESSMENT: 0-10

## 2023-02-09 ASSESSMENT — LIFESTYLE VARIABLES
HOW OFTEN DO YOU HAVE A DRINK CONTAINING ALCOHOL: NEVER
HOW MANY STANDARD DRINKS CONTAINING ALCOHOL DO YOU HAVE ON A TYPICAL DAY: PATIENT DOES NOT DRINK

## 2023-02-09 ASSESSMENT — PAIN DESCRIPTION - FREQUENCY: FREQUENCY: CONTINUOUS

## 2023-02-09 ASSESSMENT — PAIN DESCRIPTION - ORIENTATION: ORIENTATION: RIGHT

## 2023-02-09 ASSESSMENT — PAIN DESCRIPTION - ONSET: ONSET: ON-GOING

## 2023-02-09 NOTE — ED PROVIDER NOTES
[address] [facility] ED  eMERGENCY dEPARTMENT eNCOUnter      Pt Name: Naye Marcus  MRN: [id number]  [place] 1975  Date of evaluation: 2/9/2023  Provider: Juvencio Ortega PA-C    CHIEF COMPLAINT       Chief Complaint   Patient presents with    Otalgia     Right side ear pain         HISTORY OF PRESENT ILLNESS   (Location/Symptom, Timing/Onset,Context/Setting, Quality, Duration, Modifying Factors, Severity)  Note limiting factors. Naye Marcus is a 52 y.o. female who presents to the emergency department with complaint of right-sided ear pain which patient states started this morning for her. Patient states yesterday she felt as if there is something in her ear, try to clean it out with a Q-tip, she denies any acute irritation and bleeding drainage or discharge. She denies any fevers. Patient rates her current pain at this time is 10 out of 10. Past medical history significant for asthma, arthritis, depression, hepatitis C, rheumatoid,    HPI    NursingNotes were reviewed. REVIEW OF SYSTEMS    (2-9 systems for level 4, 10 or more for level 5)     Review of Systems   Constitutional:  Negative for activity change and appetite change. HENT:  Positive for ear pain. Negative for congestion, ear discharge, nosebleeds, rhinorrhea and sore throat. Eyes:  Negative for discharge. Respiratory:  Negative for shortness of breath. Cardiovascular:  Negative for chest pain, palpitations and leg swelling. Gastrointestinal:  Negative for abdominal distention, abdominal pain and constipation. Genitourinary:  Negative for difficulty urinating and dysuria. Musculoskeletal:  Negative for arthralgias. Skin:  Negative for color change. Neurological:  Negative for dizziness, syncope, numbness and headaches. Psychiatric/Behavioral:  Negative for agitation and confusion. Except as noted above the remainder of the review of systems was reviewed and negative.        PAST MEDICAL HISTORY     Past Medical History:   Diagnosis Date    Abnormal Pap smear of cervix     LAVH    Arthritis     Asthma     Breast disorder     Depression     Hepatitis C     Rheumatoid arteritis (HCC)          SURGICALHISTORY       Past Surgical History:   Procedure Laterality Date    HYSTERECTOMY (CERVIX STATUS UNKNOWN)      LUNG SURGERY      TUBAL LIGATION           CURRENT MEDICATIONS       Previous Medications    ALBUTEROL SULFATE  (90 BASE) MCG/ACT INHALER    Inhale 2 puffs into the lungs every 6 hours as needed    ALBUTEROL-IPRATROPIUM (COMBIVENT RESPIMAT)  MCG/ACT AERS INHALER    Inhale into the lungs    ALPRAZOLAM (XANAX) 0.25 MG TABLET    Take 0.25 mg by mouth 3 times daily as needed for Anxiety. ATOGEPANT (QULIPTA) 60 MG TABS    Take 60 mg by mouth daily    CLONAZEPAM (KLONOPIN) 0.5 MG TABLET    Take 0.5 mg by mouth 3 times daily as needed.      DICLOFENAC SODIUM 1 % GEL    Apply 4 g topically 4 times daily    DULOXETINE (CYMBALTA) 30 MG EXTENDED RELEASE CAPSULE    Take 1 capsule by mouth 2 times daily Start by taking one 30 mg capsule once daily x 7 days, then twice daily thereafter    HYDROXYCHLOROQUINE (PLAQUENIL) 200 MG TABLET    Take 200 mg by mouth 2 times daily (with meals)    MIRTAZAPINE (REMERON) 45 MG TABLET    Take 1 tablet by mouth nightly    OLANZAPINE (ZYPREXA) 5 MG TABLET        OMEPRAZOLE (PRILOSEC) 20 MG DELAYED RELEASE CAPSULE    Take 1 capsule by mouth every morning (before breakfast)    PRAZOSIN (MINIPRESS) 2 MG CAPSULE    Take 1 capsule by mouth nightly    RIMEGEPANT SULFATE (NURTEC) 75 MG TBDP    Take 75 mg by mouth daily as needed (migraine)       ALLERGIES     Isoniazid, Adalimumab, Buspirone, Morphine, Quetiapine, and Methotrexate    FAMILY HISTORY       Family History   Problem Relation Age of Onset    Colon Cancer Maternal Grandmother     Breast Cancer Neg Hx           SOCIAL HISTORY       Social History     Socioeconomic History    Marital status:    Tobacco Use    Smoking status: Every Day     Packs/day: 1.00     Years: 1.00     Pack years: 1.00     Types: Cigarettes    Smokeless tobacco: Never   Vaping Use    Vaping Use: Never used   Substance and Sexual Activity    Alcohol use: No    Drug use: Yes     Types: Other-see comments     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 Constitutional:       General: She is not in acute distress. Appearance: She is well-developed. She is not ill-appearing, toxic-appearing or diaphoretic. HENT:      Head: Normocephalic. Comments: Tympanic membrane on the right side is erythemic, mildly bulging. No drainage or discharge, no fluid behind TM. Left Ear: Tympanic membrane normal.      Nose: No congestion. Mouth/Throat:      Mouth: Mucous membranes are moist.      Pharynx: No oropharyngeal exudate or posterior oropharyngeal erythema. Eyes:      Extraocular Movements: Extraocular movements intact. Conjunctiva/sclera: Conjunctivae normal.      Pupils: Pupils are equal, round, and reactive to light. Neck:      Vascular: No JVD. Trachea: No tracheal deviation. Cardiovascular:      Rate and Rhythm: Normal rate. Pulses: Normal pulses. Heart sounds: Normal heart sounds. No murmur heard. No friction rub. No gallop. Pulmonary:      Effort: Pulmonary effort is normal. No tachypnea, accessory muscle usage, respiratory distress or retractions. Breath sounds: No stridor. No wheezing, rhonchi or rales.    Chest:      Chest wall: No tenderness. Abdominal:      General: Abdomen is flat. Bowel sounds are normal. There is no distension or abdominal bruit. Palpations: There is no shifting dullness, fluid wave, hepatomegaly, splenomegaly, mass or pulsatile mass. Tenderness: There is no abdominal tenderness. There is no right CVA tenderness, left CVA tenderness, guarding or rebound. Negative signs include Leal's sign, Rovsing's sign and McBurney's sign. Musculoskeletal:         General: No deformity. Cervical back: Normal range of motion and neck supple. No rigidity. Skin:     General: Skin is warm and dry. Capillary Refill: Capillary refill takes less than 2 seconds. Coloration: Skin is not jaundiced. Neurological:      General: No focal deficit present. Mental Status: She is alert and oriented to person, place, and time. Mental status is at baseline. Cranial Nerves: No cranial nerve deficit. Sensory: No sensory deficit. Motor: No weakness. Coordination: Coordination normal.   Psychiatric:         Mood and Affect: Mood normal.       DIAGNOSTIC RESULTS     EKG: All EKG's are interpreted by the Emergency Department Physician who either signs or Co-signsthis chart in the absence of a cardiologist.        RADIOLOGY:   Marionette Lata such as CT, Ultrasound and MRI are read by the radiologist. Plain radiographic images are visualized and preliminarily interpreted by the emergency physician with the below findings:      Interpretation per the Radiologist below, if available at the time ofthis note:    No orders to display         ED BEDSIDE ULTRASOUND:   Performed by ED Physician - none    LABS:  Labs Reviewed - No data to display    All other labs were within normal range or not returned as of this dictation.     EMERGENCY DEPARTMENT COURSE and DIFFERENTIAL DIAGNOSIS/MDM:   Vitals:    Vitals:    02/09/23 0607   BP: 133/89   Pulse: 82   Resp: 18   Temp: 98.3 °F (36.8 °C)   SpO2: 97% Weight: 198 lb (89.8 kg)   Height: 5' 5\" (1.651 m)            MDM  Number of Diagnoses or Management Options  Right non-suppurative otitis media  Diagnosis management comments: Patient complains of right-sided ear pain which started this morning for her, there is no drainage or discharge, no fevers, no headaches, no blurred vision, no numbness or tingling or weakness to extremities. No difficulty with ambulation. Patient has what appears as otitis media, tympanic membrane is erythemic and bulging. She was given a prescription for amoxicillin, Motrin. She was advised to contact her family provider for follow-up in the next 3 days for reevaluation, should she have any worsening or change in condition, advised to return to the ED. CRITICAL CARE TIME   Total Critical Care time was 0 minutes, excluding separately reportableprocedures. There was a high probability of clinicallysignificant/life threatening deterioration in the patient's condition which required my urgent intervention. CONSULTS:  None    PROCEDURES:  Unless otherwise noted below, none     Procedures    FINAL IMPRESSION      1. Right non-suppurative otitis media          DISPOSITION/PLAN   DISPOSITION Decision To Discharge 02/09/2023 06:23:03 AM      PATIENT REFERRED TO:  MD LATONIA Guillaume [address]   [address] [address]  [phone]    In 3 days      DISCHARGE MEDICATIONS:  New Prescriptions    AMOXICILLIN (AMOXIL) 500 MG CAPSULE    Take 1 capsule by mouth 2 times daily for 7 days    IBUPROFEN (IBU) 800 MG TABLET    Take 1 tablet by mouth every 8 hours as needed for Pain          (Please note that portions of this note were completed with a voice recognition program.  Efforts were made to edit the dictations but occasionally words are mis-transcribed.)    Judy Bowman PA-C (electronically signed)  Attending Emergency Physician         Judy Bowman PA-C  02/09/23 0106       Judy Bowman PA-C  02/09/23 6736

## 2023-02-09 NOTE — ED NOTES
This patient was assessed by the doctor only. Nurse processed and completed the orders from this doctor ie labs, meds, and/or EKG.           Sandra Ramirez RN  02/09/23 1184

## 2023-02-09 NOTE — ED TRIAGE NOTES
Patient present to the ED from home with ear pain that started this morning. Patient describe pain as throbbing sharp pain. Patient denies trauma to the area. Patient is A/O x 4 in triage.

## 2023-03-01 ENCOUNTER — HOSPITAL ENCOUNTER (EMERGENCY)
Age: 48
Discharge: HOME | End: 2023-03-01
Payer: MEDICARE

## 2023-03-01 VITALS
HEART RATE: 71 BPM | OXYGEN SATURATION: 97 % | BODY MASS INDEX: 33.99 KG/M2 | WEIGHT: 204 LBS | DIASTOLIC BLOOD PRESSURE: 84 MMHG | HEIGHT: 65 IN | TEMPERATURE: 97.8 F | RESPIRATION RATE: 16 BRPM | SYSTOLIC BLOOD PRESSURE: 152 MMHG

## 2023-03-01 DIAGNOSIS — M54.50 ACUTE EXACERBATION OF CHRONIC LOW BACK PAIN: Primary | ICD-10-CM

## 2023-03-01 DIAGNOSIS — G89.29 ACUTE EXACERBATION OF CHRONIC LOW BACK PAIN: Primary | ICD-10-CM

## 2023-03-01 PROCEDURE — 99284 EMERGENCY DEPT VISIT MOD MDM: CPT

## 2023-03-01 PROCEDURE — 96372 THER/PROPH/DIAG INJ SC/IM: CPT

## 2023-03-01 PROCEDURE — 6370000000 HC RX 637 (ALT 250 FOR IP): Performed by: STUDENT IN AN ORGANIZED HEALTH CARE EDUCATION/TRAINING PROGRAM

## 2023-03-01 PROCEDURE — 6360000002 HC RX W HCPCS: Performed by: STUDENT IN AN ORGANIZED HEALTH CARE EDUCATION/TRAINING PROGRAM

## 2023-03-01 RX ORDER — KETOROLAC TROMETHAMINE 30 MG/ML
30 INJECTION, SOLUTION INTRAMUSCULAR; INTRAVENOUS ONCE
Status: COMPLETED | OUTPATIENT
Start: 2023-03-01 | End: 2023-03-01

## 2023-03-01 RX ORDER — ONDANSETRON 4 MG/1
4 TABLET, ORALLY DISINTEGRATING ORAL ONCE
Status: COMPLETED | OUTPATIENT
Start: 2023-03-01 | End: 2023-03-01

## 2023-03-01 RX ORDER — METHOCARBAMOL 500 MG/1
1000 TABLET, FILM COATED ORAL ONCE
Status: COMPLETED | OUTPATIENT
Start: 2023-03-01 | End: 2023-03-01

## 2023-03-01 RX ORDER — HYDROCODONE BITARTRATE AND ACETAMINOPHEN 5; 325 MG/1; MG/1
1 TABLET ORAL ONCE
Status: COMPLETED | OUTPATIENT
Start: 2023-03-01 | End: 2023-03-01

## 2023-03-01 RX ADMIN — HYDROCODONE BITARTRATE AND ACETAMINOPHEN 1 TABLET: 5; 325 TABLET ORAL at 19:28

## 2023-03-01 RX ADMIN — METHOCARBAMOL 1000 MG: 500 TABLET ORAL at 19:28

## 2023-03-01 RX ADMIN — KETOROLAC TROMETHAMINE 30 MG: 30 INJECTION, SOLUTION INTRAMUSCULAR; INTRAVENOUS at 19:28

## 2023-03-01 RX ADMIN — ONDANSETRON 4 MG: 4 TABLET, ORALLY DISINTEGRATING ORAL at 19:28

## 2023-03-01 ASSESSMENT — PAIN SCALES - GENERAL: PAINLEVEL_OUTOF10: 10

## 2023-03-01 ASSESSMENT — ENCOUNTER SYMPTOMS
WHEEZING: 0
PHOTOPHOBIA: 0
SHORTNESS OF BREATH: 0
NAUSEA: 0
BACK PAIN: 1
COUGH: 0
ABDOMINAL PAIN: 0
VOMITING: 0

## 2023-03-01 ASSESSMENT — PAIN DESCRIPTION - LOCATION: LOCATION: BACK

## 2023-03-01 ASSESSMENT — PAIN DESCRIPTION - PAIN TYPE: TYPE: CHRONIC PAIN

## 2023-03-01 ASSESSMENT — PAIN DESCRIPTION - ORIENTATION: ORIENTATION: LOWER

## 2023-03-01 ASSESSMENT — PAIN DESCRIPTION - ONSET: ONSET: ON-GOING

## 2023-03-01 ASSESSMENT — PAIN - FUNCTIONAL ASSESSMENT: PAIN_FUNCTIONAL_ASSESSMENT: 0-10

## 2023-03-02 NOTE — ED PROVIDER NOTES
[address] [facility] ED  EMERGENCY DEPARTMENT ENCOUNTER      Pt Name: Ro Reynoso  MRN: [id number]  [place] 1975  Date of evaluation: 3/1/2023  Provider: Martina Young Dr       Chief Complaint   Patient presents with    Back Pain         HISTORY OF PRESENT ILLNESS   (Location/Symptom, Timing/Onset, Context/Setting, Quality, Duration, Modifying Factors, Severity)  Note limiting factors. Ro Reynoso is a 52 y.o. female who per chart review has a past medical history of drug abuse in remission asthma rheumatoid arthritis chronic thrombosis of axillary veins fibromyalgia GERD depression hepatitis C presents to the emergency department for evaluation of acute on chronic low back pain. Patient states that she has chronic back pain secondary to rheumatoid arthritis and degenerative disc disease. She follows with Dr. Baldomero Schaeffer of pain management via [facility]. She received a nerve block on 2/22 which did not relieve the pain until today. She states she was told to move around when she got home after the procedure and she thinks she overdid it. She states she bent over today and felt immediate pain across low back. It is worse on the left. It radiates down the posterior lower extremity. Currently rated 10 out of 10. Worsens with movement and palpation. She is stable to ambulate. She states she has tried over-the-counter medications without relief. She denies any falls or direct trauma to the back. She denies drug use specifically IV drug abuse. She denies fever chills chest pain shortness of breath abdominal pain bowel or bladder incontinence urinary retention saddle anesthesia numbness tingling or weakness. HPI    Nursing Notes were reviewed. REVIEW OF SYSTEMS    (2-9 systems for level 4, 10 or more for level 5)     Review of Systems   Constitutional:  Negative for chills and fever. HENT:  Negative for congestion. Eyes:  Negative for photophobia.    Respiratory:  Negative for cough, shortness of breath and wheezing.    Cardiovascular:  Negative for chest pain and palpitations.   Gastrointestinal:  Negative for abdominal pain, nausea and vomiting.   Genitourinary:  Negative for dysuria, frequency and hematuria.   Musculoskeletal:  Positive for back pain. Negative for myalgias.   Allergic/Immunologic: Negative for immunocompromised state.   Neurological:  Negative for dizziness, weakness and headaches.   All other systems reviewed and are negative.    Except as noted above the remainder of the review of systems was reviewed and negative.       PAST MEDICAL HISTORY     Past Medical History:   Diagnosis Date    Abnormal Pap smear of cervix     LAVH    Arthritis     Asthma     Breast disorder     Depression     Hepatitis C     Rheumatoid arteritis (HCC)          SURGICAL HISTORY       Past Surgical History:   Procedure Laterality Date    HYSTERECTOMY (CERVIX STATUS UNKNOWN)      LUNG SURGERY      TUBAL LIGATION           CURRENT MEDICATIONS       Previous Medications    ALBUTEROL SULFATE  (90 BASE) MCG/ACT INHALER    Inhale 2 puffs into the lungs every 6 hours as needed    ALBUTEROL-IPRATROPIUM (COMBIVENT RESPIMAT)  MCG/ACT AERS INHALER    Inhale into the lungs    ALPRAZOLAM (XANAX) 0.25 MG TABLET    Take 0.25 mg by mouth 3 times daily as needed for Anxiety.     ATOGEPANT (QULIPTA) 60 MG TABS    Take 60 mg by mouth daily    CLONAZEPAM (KLONOPIN) 0.5 MG TABLET    Take 0.5 mg by mouth 3 times daily as needed.     DICLOFENAC SODIUM 1 % GEL    Apply 4 g topically 4 times daily    DULOXETINE (CYMBALTA) 30 MG EXTENDED RELEASE CAPSULE    Take 1 capsule by mouth 2 times daily Start by taking one 30 mg capsule once daily x 7 days, then twice daily thereafter    HYDROXYCHLOROQUINE (PLAQUENIL) 200 MG TABLET    Take 200 mg by mouth 2 times daily (with meals)    IBUPROFEN (IBU) 800 MG TABLET    Take 1 tablet by mouth every 8 hours as needed for Pain    MIRTAZAPINE (REMERON) 45 MG TABLET     Take 1 tablet by mouth nightly    NAPROXEN (NAPROSYN) 500 MG TABLET    Take 500 mg by mouth 2 times daily as needed    OLANZAPINE (ZYPREXA) 5 MG TABLET        OMEPRAZOLE (PRILOSEC) 20 MG DELAYED RELEASE CAPSULE    Take 1 capsule by mouth every morning (before breakfast)    PRAZOSIN (MINIPRESS) 2 MG CAPSULE    Take 1 capsule by mouth nightly    RIMEGEPANT SULFATE (NURTEC) 75 MG TBDP    Take 75 mg by mouth daily as needed (migraine)    TIZANIDINE (ZANAFLEX) 4 MG TABLET    Take 4 mg by mouth At bedtime as needed       ALLERGIES     Isoniazid, Adalimumab, Buspirone, Morphine, Quetiapine, and Methotrexate    FAMILY HISTORY       Family History   Problem Relation Age of Onset    Colon Cancer Maternal Grandmother     Breast Cancer Neg Hx           SOCIAL HISTORY       Social History     Socioeconomic History    Marital status:      Spouse name: None    Number of children: None    Years of education: None    Highest education level: None   Tobacco Use    Smoking status: Every Day     Packs/day: 1.00     Years: 1.00     Pack years: 1.00     Types: Cigarettes    Smokeless tobacco: Never   Vaping Use    Vaping Use: Never used   Substance and Sexual Activity    Alcohol use: No    Drug use: Yes     Types: Other-see comments     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 Appearance: She is well-developed. She is not ill-appearing, toxic-appearing or diaphoretic. HENT:      Head: Normocephalic and atraumatic. Nose: Nose normal.      Mouth/Throat:      Mouth: Mucous membranes are moist.   Eyes:      Pupils: Pupils are equal, round, and reactive to light. Cardiovascular:      Rate and Rhythm: Normal rate and regular rhythm. Heart sounds: No murmur heard. No friction rub. No gallop. Pulmonary:      Effort: Pulmonary effort is normal.      Breath sounds: Normal breath sounds. No wheezing, rhonchi or rales. Abdominal:      General: Bowel sounds are normal. There is no distension. Palpations: Abdomen is soft. Tenderness: There is no abdominal tenderness. There is no right CVA tenderness, left CVA tenderness, guarding or rebound. Musculoskeletal:         General: No swelling. Cervical back: Normal and normal range of motion. Thoracic back: Normal.      Lumbar back: Tenderness present. No swelling, edema, signs of trauma, spasms or bony tenderness. Decreased range of motion. Positive left straight leg raise test. Negative right straight leg raise test.      Comments: Bilateral lumbar ttp. No midline deformity or bony step off pain worse on the L. +SLR on the L. Bilateral LE neurovascularly intact, strength 5/5, moving extremities spontaneously, DTRs intact and symmetric, sensation intact. Skin:     General: Skin is warm and dry. Capillary Refill: Capillary refill takes less than 2 seconds. Neurological:      General: No focal deficit present. Mental Status: She is alert and oriented to person, place, and time. Cranial Nerves: Cranial nerves 2-12 are intact. Sensory: Sensation is intact. Motor: Motor function is intact. Coordination: Coordination is intact. Gait: Gait abnormal (2/2 to pain). Deep Tendon Reflexes: Reflexes are normal and symmetric.        DIAGNOSTIC RESULTS     EKG: All EKG's are interpreted by the Emergency Department Physician who either signs or Co-signs this chart in the absence of a cardiologist.        RADIOLOGY:   Non-plain film images such as CT, Ultrasound and MRI are read by the radiologist. Plain radiographic images are visualized and preliminarily interpreted by the emergency physician with the below findings:        Interpretation per the Radiologist below, if available at the time of this note:    No orders to display         ED BEDSIDE ULTRASOUND:   Performed by ED Physician - none    LABS:  Labs Reviewed - No data to display    All other labs were within normal range or not returned as of this dictation. EMERGENCY DEPARTMENT COURSE and DIFFERENTIAL DIAGNOSIS/MDM:   Vitals:    Vitals:    03/01/23 1858 03/01/23 1900   BP:  (!) 152/84   Pulse: 71    Resp: 16    Temp: 97.8 °F (36.6 °C)    TempSrc: Temporal    SpO2: 97%    Weight: 204 lb (92.5 kg)    Height: 5' 5\" (1.651 m)            Medical Decision Making  Risk  Prescription drug management. Pt presents with acute on chronic low back pain. She is afebrile, HD stable. Given po norco, po zofran, IM toradol and po robaxin in the ED. given history of chronic back pain, no traumatic injury to suggest fracture I do not think x-ray imaging of the lumbar spine was indicated in the ED today. No signs symptoms or physical exam findings to suggest cauda equina syndrome low concern for spinal epidural abscess therefore do not feel emergent MRI imaging of the lumbar spine was indicated in the ED today. Patient reassessed states she is feeling much better. She was ambulatory in the ED without difficulty. She is stable for outpatient management. She was encouraged to contact her primary physician and pain management physician within the next day to schedule a follow-up appointment. Return to the ED in the meantime for worsening symptoms given warning signs for which she should return. Patient understands agrees to plan.   Patient declines any medication to be scribed upon discharge as she states she has prednisone and muscle relaxers at home. REASSESSMENT          CRITICAL CARE TIME   Total Critical Care time was 0 minutes, excluding separately reportable procedures. There was a high probability of clinically significant/life threatening deterioration in the patient's condition which required my urgent intervention. CONSULTS:  None    PROCEDURES:  Unless otherwise noted below, none     Procedures        FINAL IMPRESSION      1. Acute exacerbation of chronic low back pain          DISPOSITION/PLAN   DISPOSITION Decision To Discharge 03/01/2023 07:51:06 PM      PATIENT REFERRED TO:  MD LATONAI [address] [place] [address]  [phone]    Schedule an appointment as soon as possible for a visit in 1 day      [address] [address] [place]  [address] [id number]    Schedule an appointment as soon as possible for a visit in 1 day      [facility]) ED  [address] [address]  [phone]  Go to   As needed, If symptoms worsen    DISCHARGE MEDICATIONS:  New Prescriptions    No medications on file     Controlled Substances Monitoring:     RX Monitoring 7/7/2019   Acute Pain Prescriptions Severe pain not adequately treated with lower dose. Periodic Controlled Substance Monitoring Possible medication side effects, risk of tolerance/dependence & alternative treatments discussed.        (Please note that portions of this note were completed with a voice recognition program.  Efforts were made to edit the dictations but occasionally words are mis-transcribed.)    Js East PA-C (electronically signed)             Js East PA-C  03/01/23 2002

## 2023-03-02 NOTE — ED TRIAGE NOTES
Pt c/o lower back pain, chronic pain, pt is in pain management. Pt states she received a shot last Thursday but the pain is works. Pt denies incontinence. Pt is A&OX4, skin intact, afebrile, breaths are equal and unlabored.

## 2023-03-25 ENCOUNTER — HOSPITAL ENCOUNTER (EMERGENCY)
Age: 48
Discharge: HOME | End: 2023-03-25
Payer: MEDICARE

## 2023-03-25 ENCOUNTER — APPOINTMENT (OUTPATIENT)
Dept: GENERAL RADIOLOGY | Age: 48
End: 2023-03-25
Payer: MEDICARE

## 2023-03-25 VITALS
TEMPERATURE: 98.5 F | OXYGEN SATURATION: 99 % | HEART RATE: 78 BPM | RESPIRATION RATE: 14 BRPM | SYSTOLIC BLOOD PRESSURE: 131 MMHG | DIASTOLIC BLOOD PRESSURE: 99 MMHG

## 2023-03-25 DIAGNOSIS — R07.89 CHEST WALL PAIN: Primary | ICD-10-CM

## 2023-03-25 DIAGNOSIS — Z87.39 HISTORY OF RHEUMATOID ARTHRITIS: ICD-10-CM

## 2023-03-25 LAB
ALBUMIN SERPL-MCNC: 3.6 G/DL (ref 3.5–4.6)
ALP SERPL-CCNC: 106 U/L (ref 40–130)
ALT SERPL-CCNC: 10 U/L (ref 0–33)
ANION GAP SERPL CALCULATED.3IONS-SCNC: 8 MEQ/L (ref 9–15)
AST SERPL-CCNC: 14 U/L (ref 0–35)
BASOPHILS # BLD: 0 K/UL (ref 0–0.2)
BASOPHILS NFR BLD: 0.4 %
BILIRUB SERPL-MCNC: <0.2 MG/DL (ref 0.2–0.7)
BNP BLD-MCNC: <5 PG/ML
BUN SERPL-MCNC: 12 MG/DL (ref 6–20)
CALCIUM SERPL-MCNC: 9.2 MG/DL (ref 8.5–9.9)
CHLORIDE SERPL-SCNC: 109 MEQ/L (ref 95–107)
CO2 SERPL-SCNC: 26 MEQ/L (ref 20–31)
CREAT SERPL-MCNC: 0.77 MG/DL (ref 0.5–0.9)
EOSINOPHIL # BLD: 0.2 K/UL (ref 0–0.7)
EOSINOPHIL NFR BLD: 2.4 %
ERYTHROCYTE [DISTWIDTH] IN BLOOD BY AUTOMATED COUNT: 13.2 % (ref 11.5–14.5)
GLOBULIN SER CALC-MCNC: 3.6 G/DL (ref 2.3–3.5)
GLUCOSE SERPL-MCNC: 95 MG/DL (ref 70–99)
HCT VFR BLD AUTO: 37.1 % (ref 37–47)
HGB BLD-MCNC: 12.5 G/DL (ref 12–16)
LYMPHOCYTES # BLD: 1.5 K/UL (ref 1–4.8)
LYMPHOCYTES NFR BLD: 14.9 %
MAGNESIUM SERPL-MCNC: 2 MG/DL (ref 1.7–2.4)
MCH RBC QN AUTO: 28.4 PG (ref 27–31.3)
MCHC RBC AUTO-ENTMCNC: 33.8 % (ref 33–37)
MCV RBC AUTO: 84 FL (ref 79.4–94.8)
MONOCYTES # BLD: 0.6 K/UL (ref 0.2–0.8)
MONOCYTES NFR BLD: 5.5 %
NEUTROPHILS # BLD: 7.8 K/UL (ref 1.4–6.5)
NEUTS SEG NFR BLD: 76.8 %
PLATELET # BLD AUTO: 252 K/UL (ref 130–400)
POTASSIUM SERPL-SCNC: 4.2 MEQ/L (ref 3.4–4.9)
PROT SERPL-MCNC: 7.2 G/DL (ref 6.3–8)
RBC # BLD AUTO: 4.42 M/UL (ref 4.2–5.4)
SODIUM SERPL-SCNC: 143 MEQ/L (ref 135–144)
TROPONIN T SERPL-MCNC: <0.01 NG/ML (ref 0–0.01)
WBC # BLD AUTO: 10.2 K/UL (ref 4.8–10.8)

## 2023-03-25 PROCEDURE — 99284 EMERGENCY DEPT VISIT MOD MDM: CPT

## 2023-03-25 PROCEDURE — 36415 COLL VENOUS BLD VENIPUNCTURE: CPT

## 2023-03-25 PROCEDURE — 96375 TX/PRO/DX INJ NEW DRUG ADDON: CPT

## 2023-03-25 PROCEDURE — 85025 COMPLETE CBC W/AUTO DIFF WBC: CPT

## 2023-03-25 PROCEDURE — 83880 ASSAY OF NATRIURETIC PEPTIDE: CPT

## 2023-03-25 PROCEDURE — 84484 ASSAY OF TROPONIN QUANT: CPT

## 2023-03-25 PROCEDURE — 71045 X-RAY EXAM CHEST 1 VIEW: CPT

## 2023-03-25 PROCEDURE — 6360000002 HC RX W HCPCS

## 2023-03-25 PROCEDURE — 83735 ASSAY OF MAGNESIUM: CPT

## 2023-03-25 PROCEDURE — 96374 THER/PROPH/DIAG INJ IV PUSH: CPT

## 2023-03-25 PROCEDURE — 80053 COMPREHEN METABOLIC PANEL: CPT

## 2023-03-25 RX ORDER — METHYLPREDNISOLONE 4 MG/1
TABLET ORAL
Qty: 21 TABLET | Refills: 0 | Status: SHIPPED | OUTPATIENT
Start: 2023-03-25 | End: 2023-03-31

## 2023-03-25 RX ORDER — METHYLPREDNISOLONE SODIUM SUCCINATE 125 MG/2ML
60 INJECTION, POWDER, LYOPHILIZED, FOR SOLUTION INTRAMUSCULAR; INTRAVENOUS ONCE
Status: COMPLETED | OUTPATIENT
Start: 2023-03-25 | End: 2023-03-25

## 2023-03-25 RX ORDER — KETOROLAC TROMETHAMINE 15 MG/ML
15 INJECTION, SOLUTION INTRAMUSCULAR; INTRAVENOUS ONCE
Status: COMPLETED | OUTPATIENT
Start: 2023-03-25 | End: 2023-03-25

## 2023-03-25 RX ADMIN — METHYLPREDNISOLONE SODIUM SUCCINATE 60 MG: 125 INJECTION, POWDER, FOR SOLUTION INTRAMUSCULAR; INTRAVENOUS at 05:40

## 2023-03-25 RX ADMIN — KETOROLAC TROMETHAMINE 15 MG: 15 INJECTION, SOLUTION INTRAMUSCULAR; INTRAVENOUS at 05:43

## 2023-03-25 ASSESSMENT — ENCOUNTER SYMPTOMS
DIARRHEA: 0
VOMITING: 0
ABDOMINAL PAIN: 0
PHOTOPHOBIA: 0
NAUSEA: 0
COUGH: 0
CHEST TIGHTNESS: 1
SHORTNESS OF BREATH: 0

## 2023-03-25 ASSESSMENT — PAIN DESCRIPTION - PAIN TYPE: TYPE: ACUTE PAIN

## 2023-03-25 ASSESSMENT — PAIN DESCRIPTION - LOCATION: LOCATION: CHEST

## 2023-03-25 ASSESSMENT — PAIN SCALES - GENERAL: PAINLEVEL_OUTOF10: 7

## 2023-03-25 ASSESSMENT — PAIN - FUNCTIONAL ASSESSMENT
PAIN_FUNCTIONAL_ASSESSMENT: 0-10
PAIN_FUNCTIONAL_ASSESSMENT: NONE - DENIES PAIN

## 2023-03-25 NOTE — ED TRIAGE NOTES
Pt to ED due to chest pain that started yesterday. Pt states the pain is worse when she breathes. Pt is alert and oriented x4. Skin is warm, dry, intact.  Resp are regular and equal.

## 2023-03-25 NOTE — ED PROVIDER NOTES
Breast disorder     Depression     Hepatitis C     Rheumatoid arteritis ([facility] [address]          SURGICAL HISTORY       Past Surgical History:   Procedure Laterality Date    HYSTERECTOMY (CERVIX STATUS UNKNOWN)      LUNG SURGERY      TUBAL LIGATION           CURRENT MEDICATIONS       Discharge Medication List as of 3/25/2023  7:04 AM        CONTINUE these medications which have NOT CHANGED    Details   naproxen (NAPROSYN) 500 MG tablet Take 500 mg by mouth 2 times daily as neededHistorical Med      tiZANidine (ZANAFLEX) 4 MG tablet Take 4 mg by mouth At bedtime as neededHistorical Med      ibuprofen (IBU) 800 MG tablet Take 1 tablet by mouth every 8 hours as needed for Pain, Disp-15 tablet, R-0Print      Atogepant (QULIPTA) 60 MG TABS Take 60 mg by mouth daily, Disp-30 tablet, R-5Normal      Rimegepant Sulfate (NURTEC) 75 MG TBDP Take 75 mg by mouth daily as needed (migraine), Disp-8 tablet, R-3Normal      OLANZapine (ZYPREXA) 5 MG tablet Historical Med      omeprazole (PRILOSEC) 20 MG delayed release capsule Take 1 capsule by mouth every morning (before breakfast), Disp-30 capsule, R-0Print      albuterol sulfate  (90 Base) MCG/ACT inhaler Inhale 2 puffs into the lungs every 6 hours as neededHistorical Med      hydroxychloroquine (PLAQUENIL) 200 MG tablet Take 200 mg by mouth 2 times daily (with meals)Historical Med      albuterol-ipratropium (COMBIVENT RESPIMAT)  MCG/ACT AERS inhaler Inhale into the lungsHistorical Med      mirtazapine (REMERON) 45 MG tablet Take 1 tablet by mouth nightlyHistorical Med      prazosin (MINIPRESS) 2 MG capsule Take 1 capsule by mouth nightlyHistorical Med      DULoxetine (CYMBALTA) 30 MG extended release capsule Take 1 capsule by mouth 2 times daily Start by taking one 30 mg capsule once daily x 7 days, then twice daily thereafter, Disp-60 capsule, R-3Normal      diclofenac sodium 1 % GEL Apply 4 g topically 4 times daily, Topical, 4 TIMES DAILY Starting Tue 8/27/2019, Disp-4 no accessory muscle use no distress  Chest:      Chest wall: Tenderness present. Abdominal:      General: Bowel sounds are normal. There is no distension. Palpations: Abdomen is soft. Tenderness: There is no abdominal tenderness. Musculoskeletal:         General: Normal range of motion. Cervical back: Normal range of motion. Skin:     General: Skin is warm. Neurological:      Mental Status: She is alert and oriented to person, place, and time. Psychiatric:         Mood and Affect: Mood normal.         Behavior: Behavior normal.         LABS:  Labs Reviewed   CBC WITH AUTO DIFFERENTIAL - Abnormal; Notable for the following components:       Result Value    Neutrophils Absolute 7.8 (*)     All other components within normal limits   COMPREHENSIVE METABOLIC PANEL - Abnormal; Notable for the following components:    Chloride 109 (*)     Anion Gap 8 (*)     Globulin 3.6 (*)     All other components within normal limits   MAGNESIUM   TROPONIN   BRAIN NATRIURETIC PEPTIDE       EKG NSR HR 83 QT/QTc 362/425 no axis deviation no acute ST segment changes no significant change from prior 11/5/2022    MDM:   Vitals:    Vitals:    03/25/23 0505 03/25/23 0712   BP: (!) 179/84 (!) 131/99   Pulse: 93 78   Resp: 14    Temp: 98.5 °F (36.9 °C)    TempSrc: Oral    SpO2: 96% 99%       [facility]      Mauri Lovett PA-C assumed care from [place], [place] at 6 am on 3/25/23 pending results of labs. Pt is a 53 yo F who presents to the ED for evaluation of chest pain. Pt states hx of RA and current sx feel similar, states pain started when it began to rain yesterday which is typical of her RA exacerbations. Cp is reproducible and worsens with deep inspiration. On my reassessment, pt stating chest pain has completely resolved. She was given toradol prior to shift change. She is stating she is hungry. Labs unremarkable. I reviewed EKG and CXR. No acute ST elevations, no acute intrathoracic process.  Chest pain is

## 2023-03-31 LAB
EKG ATRIAL RATE: 83 BPM
EKG P AXIS: 82 DEGREES
EKG P-R INTERVAL: 126 MS
EKG Q-T INTERVAL: 362 MS
EKG QRS DURATION: 100 MS
EKG QTC CALCULATION (BAZETT): 425 MS
EKG R AXIS: 27 DEGREES
EKG T AXIS: 37 DEGREES
EKG VENTRICULAR RATE: 83 BPM

## 2023-04-30 ENCOUNTER — HOSPITAL ENCOUNTER (EMERGENCY)
Age: 48
Discharge: HOME | End: 2023-04-30
Payer: MEDICARE

## 2023-04-30 VITALS
OXYGEN SATURATION: 99 % | SYSTOLIC BLOOD PRESSURE: 123 MMHG | RESPIRATION RATE: 18 BRPM | WEIGHT: 204 LBS | TEMPERATURE: 97.6 F | BODY MASS INDEX: 33.95 KG/M2 | DIASTOLIC BLOOD PRESSURE: 84 MMHG | HEART RATE: 104 BPM

## 2023-04-30 DIAGNOSIS — J30.89 ENVIRONMENTAL AND SEASONAL ALLERGIES: Primary | ICD-10-CM

## 2023-04-30 PROCEDURE — 99283 EMERGENCY DEPT VISIT LOW MDM: CPT

## 2023-04-30 RX ORDER — FEXOFENADINE HCL 180 MG/1
180 TABLET ORAL DAILY
Qty: 30 TABLET | Refills: 0 | Status: SHIPPED | OUTPATIENT
Start: 2023-04-30 | End: 2023-05-30

## 2023-04-30 ASSESSMENT — ENCOUNTER SYMPTOMS
EYE DISCHARGE: 0
RHINORRHEA: 1
ABDOMINAL DISTENTION: 0
COLOR CHANGE: 0
NAUSEA: 0
VOMITING: 0
SHORTNESS OF BREATH: 0
CONSTIPATION: 0
SORE THROAT: 0
SINUS PRESSURE: 1
ABDOMINAL PAIN: 0

## 2023-04-30 ASSESSMENT — PAIN - FUNCTIONAL ASSESSMENT: PAIN_FUNCTIONAL_ASSESSMENT: NONE - DENIES PAIN

## 2023-05-18 ENCOUNTER — APPOINTMENT (OUTPATIENT)
Dept: GENERAL RADIOLOGY | Age: 48
End: 2023-05-18
Payer: MEDICARE

## 2023-05-18 ENCOUNTER — HOSPITAL ENCOUNTER (EMERGENCY)
Age: 48
Discharge: HOME | End: 2023-05-18
Attending: FAMILY MEDICINE
Payer: MEDICARE

## 2023-05-18 ENCOUNTER — APPOINTMENT (OUTPATIENT)
Dept: CT IMAGING | Age: 48
End: 2023-05-18
Payer: MEDICARE

## 2023-05-18 VITALS
TEMPERATURE: 98 F | HEIGHT: 65 IN | OXYGEN SATURATION: 97 % | HEART RATE: 80 BPM | WEIGHT: 201 LBS | RESPIRATION RATE: 16 BRPM | BODY MASS INDEX: 33.49 KG/M2 | DIASTOLIC BLOOD PRESSURE: 85 MMHG | SYSTOLIC BLOOD PRESSURE: 131 MMHG

## 2023-05-18 DIAGNOSIS — R00.2 PALPITATIONS: Primary | ICD-10-CM

## 2023-05-18 DIAGNOSIS — T88.7XXA MEDICATION SIDE EFFECT: ICD-10-CM

## 2023-05-18 LAB
ALBUMIN SERPL-MCNC: 3.8 G/DL (ref 3.5–4.6)
ALP SERPL-CCNC: 124 U/L (ref 40–130)
ALT SERPL-CCNC: <5 U/L (ref 0–33)
ANION GAP SERPL CALCULATED.3IONS-SCNC: 9 MEQ/L (ref 9–15)
AST SERPL-CCNC: 19 U/L (ref 0–35)
BASOPHILS # BLD: 0 K/UL (ref 0–0.2)
BASOPHILS NFR BLD: 0.8 %
BILIRUB SERPL-MCNC: <0.2 MG/DL (ref 0.2–0.7)
BNP BLD-MCNC: 41 PG/ML
BUN SERPL-MCNC: 11 MG/DL (ref 6–20)
CALCIUM SERPL-MCNC: 9.3 MG/DL (ref 8.5–9.9)
CHLORIDE SERPL-SCNC: 103 MEQ/L (ref 95–107)
CO2 SERPL-SCNC: 28 MEQ/L (ref 20–31)
CREAT SERPL-MCNC: 0.81 MG/DL (ref 0.5–0.9)
EOSINOPHIL # BLD: 0.3 K/UL (ref 0–0.7)
EOSINOPHIL NFR BLD: 4.7 %
ERYTHROCYTE [DISTWIDTH] IN BLOOD BY AUTOMATED COUNT: 13.1 % (ref 11.5–14.5)
GLOBULIN SER CALC-MCNC: 4.3 G/DL (ref 2.3–3.5)
GLUCOSE SERPL-MCNC: 97 MG/DL (ref 70–99)
HCT VFR BLD AUTO: 40.3 % (ref 37–47)
HGB BLD-MCNC: 13.4 G/DL (ref 12–16)
LYMPHOCYTES # BLD: 1.8 K/UL (ref 1–4.8)
LYMPHOCYTES NFR BLD: 27.8 %
MCH RBC QN AUTO: 27.6 PG (ref 27–31.3)
MCHC RBC AUTO-ENTMCNC: 33.4 % (ref 33–37)
MCV RBC AUTO: 82.7 FL (ref 79.4–94.8)
MONOCYTES # BLD: 0.4 K/UL (ref 0.2–0.8)
MONOCYTES NFR BLD: 6.6 %
NEUTROPHILS # BLD: 3.9 K/UL (ref 1.4–6.5)
NEUTS SEG NFR BLD: 60.1 %
PERFORMED ON: NORMAL
PLATELET # BLD AUTO: 318 K/UL (ref 130–400)
POC CREATININE: 0.9 MG/DL (ref 0.6–1.2)
POC SAMPLE TYPE: NORMAL
POTASSIUM SERPL-SCNC: 4.2 MEQ/L (ref 3.4–4.9)
PROT SERPL-MCNC: 8.1 G/DL (ref 6.3–8)
RBC # BLD AUTO: 4.87 M/UL (ref 4.2–5.4)
SODIUM SERPL-SCNC: 140 MEQ/L (ref 135–144)
TROPONIN T SERPL-MCNC: <0.01 NG/ML (ref 0–0.01)
TSH REFLEX: 1.64 UIU/ML (ref 0.44–3.86)
WBC # BLD AUTO: 6.4 K/UL (ref 4.8–10.8)

## 2023-05-18 PROCEDURE — 71275 CT ANGIOGRAPHY CHEST: CPT

## 2023-05-18 PROCEDURE — 99285 EMERGENCY DEPT VISIT HI MDM: CPT

## 2023-05-18 PROCEDURE — 6360000004 HC RX CONTRAST MEDICATION: Performed by: FAMILY MEDICINE

## 2023-05-18 PROCEDURE — 85025 COMPLETE CBC W/AUTO DIFF WBC: CPT

## 2023-05-18 PROCEDURE — 80053 COMPREHEN METABOLIC PANEL: CPT

## 2023-05-18 PROCEDURE — 84484 ASSAY OF TROPONIN QUANT: CPT

## 2023-05-18 PROCEDURE — 83880 ASSAY OF NATRIURETIC PEPTIDE: CPT

## 2023-05-18 PROCEDURE — 71045 X-RAY EXAM CHEST 1 VIEW: CPT

## 2023-05-18 PROCEDURE — 84443 ASSAY THYROID STIM HORMONE: CPT

## 2023-05-18 PROCEDURE — 36415 COLL VENOUS BLD VENIPUNCTURE: CPT

## 2023-05-18 RX ADMIN — IOPAMIDOL 75 ML: 612 INJECTION, SOLUTION INTRAVENOUS at 15:32

## 2023-05-18 ASSESSMENT — PAIN - FUNCTIONAL ASSESSMENT: PAIN_FUNCTIONAL_ASSESSMENT: NONE - DENIES PAIN

## 2023-05-18 NOTE — ED TRIAGE NOTES
Pt c/o heart palpitations, chest pain, and back pain with inhalation since starting phentermine hcl approx 6 weeks ago, Pt is A&OX4, anxious, afebrile, breathes are equal and unlabored,

## 2023-05-20 NOTE — ED PROVIDER NOTES
[address] [facility] ED  eMERGENCY dEPARTMENT eNCOUnter      Pt Name: Aldo Bone  MRN: [id number]  [place] 1975  Date of evaluation: 5/18/2023  Provider: Briseida Frazier MD    CHIEF COMPLAINT       Chief Complaint   Patient presents with    Palpitations     Pt c/o heart palpitations, back pain with inhalation and chest pain since starting phentermine HCL approx 6 weeks ago         HISTORY OF PRESENT ILLNESS   (Location/Symptom, Timing/Onset,Context/Setting, Quality, Duration, Modifying Factors, Severity)  Note limiting factors. Aldo Bone is a 52 y.o. female who presents to the emergency department palpitation medication side effect    52years old who was started on Adipex for weight loss and tyramine 3 months ago states she had since she started denies she has been having palpitation and was anxious and got on and off heart flutter but denies any chest pain shortness of breath denies any other complaint or concern    The history is provided by the patient. NursingNotes were reviewed. REVIEW OF SYSTEMS    (2-9 systems for level 4, 10 or more for level 5)     Review of Systems    Except as noted above the remainder of the review of systems was reviewed and negative.        PAST MEDICAL HISTORY     Past Medical History:   Diagnosis Date    Abnormal Pap smear of cervix     LAVH    Arthritis     Asthma     Breast disorder     Depression     Hepatitis C     Rheumatoid arteritis ([facility] [address]          SURGICALHISTORY       Past Surgical History:   Procedure Laterality Date    HYSTERECTOMY (CERVIX STATUS UNKNOWN)      LUNG SURGERY      TUBAL LIGATION           CURRENT MEDICATIONS       Discharge Medication List as of 5/18/2023  5:11 PM        CONTINUE these medications which have NOT CHANGED    Details   fexofenadine (ALLEGRA) 180 MG tablet Take 1 tablet by mouth daily, Disp-30 tablet, R-0Print      ibuprofen (IBU) 800 MG tablet Take 1 tablet by mouth every 8 hours as needed for Pain, Disp-15 tablet, R-0Print

## 2023-06-05 PROBLEM — K21.9 GASTROESOPHAGEAL REFLUX DISEASE WITHOUT ESOPHAGITIS: Status: ACTIVE | Noted: 2022-04-30

## 2023-06-05 PROBLEM — Z97.2 WEARS DENTURES: Status: ACTIVE | Noted: 2022-04-30

## 2023-06-05 PROBLEM — J45.20 MILD INTERMITTENT ASTHMA WITHOUT COMPLICATION: Status: ACTIVE | Noted: 2022-04-30

## 2023-06-05 PROBLEM — F31.9 BIPOLAR DISORDER WITH DEPRESSION (HCC): Status: ACTIVE | Noted: 2022-04-30

## 2023-06-05 PROBLEM — M51.369 DDD (DEGENERATIVE DISC DISEASE), LUMBAR: Status: ACTIVE | Noted: 2023-01-04

## 2023-06-05 PROBLEM — C53.9 MALIGNANT TUMOR OF CERVIX (HCC): Status: ACTIVE | Noted: 2018-03-14

## 2023-06-05 PROBLEM — H93.A1 PULSATILE TINNITUS, RIGHT EAR: Status: ACTIVE | Noted: 2022-05-12

## 2023-06-05 PROBLEM — M79.10 MUSCLE PAIN: Status: ACTIVE | Noted: 2023-04-17

## 2023-06-05 PROBLEM — M51.36 DDD (DEGENERATIVE DISC DISEASE), LUMBAR: Status: ACTIVE | Noted: 2023-01-04

## 2023-06-05 PROBLEM — K76.0 HEPATIC STEATOSIS: Status: ACTIVE | Noted: 2023-03-29

## 2023-06-05 PROBLEM — F19.10 POLYSUBSTANCE ABUSE (HCC): Status: ACTIVE | Noted: 2018-03-14

## 2023-06-05 PROBLEM — M47.817 LUMBOSACRAL SPONDYLOSIS WITHOUT MYELOPATHY: Status: ACTIVE | Noted: 2023-01-04

## 2023-06-05 RX ORDER — DOXEPIN HYDROCHLORIDE 10 MG/1
CAPSULE ORAL
COMMUNITY
Start: 2023-03-17

## 2023-06-05 RX ORDER — GABAPENTIN 100 MG/1
CAPSULE ORAL
COMMUNITY
Start: 2023-03-06

## 2023-06-06 ENCOUNTER — OFFICE VISIT (OUTPATIENT)
Dept: OBGYN CLINIC | Age: 48
End: 2023-06-06
Payer: MEDICARE

## 2023-06-06 VITALS — BODY MASS INDEX: 34.45 KG/M2 | WEIGHT: 207 LBS

## 2023-06-06 DIAGNOSIS — Z78.0 MENOPAUSE: Primary | ICD-10-CM

## 2023-06-06 DIAGNOSIS — Z78.0 MENOPAUSE: ICD-10-CM

## 2023-06-06 PROCEDURE — 99203 OFFICE O/P NEW LOW 30 MIN: CPT | Performed by: OBSTETRICS & GYNECOLOGY

## 2023-06-06 ASSESSMENT — ENCOUNTER SYMPTOMS
WHEEZING: 0
ABDOMINAL DISTENTION: 0
COUGH: 0
CHEST TIGHTNESS: 0
VOMITING: 0
SORE THROAT: 0
BACK PAIN: 0
VOICE CHANGE: 0
ABDOMINAL PAIN: 0
SHORTNESS OF BREATH: 0
COLOR CHANGE: 0
TROUBLE SWALLOWING: 0
CONSTIPATION: 0
BLOOD IN STOOL: 0
NAUSEA: 0

## 2023-06-06 NOTE — PROGRESS NOTES
HPI:  Leslie Degroot (: 1975) is a 52 y.o. female, Established patient, here for evaluation of the following chief complaint(s):  Follow-up (PCP would like to know if she still has her ovaries)  Patient had a hysterectomy in . She has having intermittent vasomotor symptoms. Her PCP is curious whether should have her ovaries are still present. TSH done recently which was normal a [address] [place] [address] Street and LH have not been ordered nor has an ultrasound in order      SUBJECTIVE/OBJECTIVE:    Past Surgical History:   Procedure Laterality Date    HYSTERECTOMY (CERVIX STATUS UNKNOWN)      LUNG SURGERY      TUBAL LIGATION          Review of Systems   Constitutional:  Negative for activity change, appetite change, fatigue and unexpected weight change. HENT:  Negative for dental problem, ear pain, hearing loss, nosebleeds, sore throat, trouble swallowing and voice change. Eyes:  Negative for visual disturbance. Respiratory:  Negative for cough, chest tightness, shortness of breath and wheezing. Cardiovascular:  Negative for chest pain and palpitations. Gastrointestinal:  Negative for abdominal distention, abdominal pain, blood in stool, constipation, nausea and vomiting. Endocrine: Negative for cold intolerance, heat intolerance, polydipsia, polyphagia and polyuria. Genitourinary:  Negative for difficulty urinating, dyspareunia, dysuria, flank pain, frequency, genital sores, hematuria, menstrual problem, pelvic pain, urgency, vaginal bleeding, vaginal discharge and vaginal pain. Musculoskeletal:  Negative for arthralgias, back pain, joint swelling and myalgias. Skin:  Negative for color change and rash. Allergic/Immunologic: Negative for environmental allergies, food allergies and immunocompromised state. Neurological:  Negative for dizziness, seizures, syncope, speech difficulty, weakness, numbness and headaches. Hematological:  Negative for adenopathy. Does not bruise/bleed easily.

## 2023-06-07 LAB
FOLLICLE STIMULATING HORMONE: 39.9 MIU/ML (ref 1.7–21.5)
LH: 43.1 MIU/ML (ref 1–95.6)

## 2023-08-01 ENCOUNTER — APPOINTMENT (OUTPATIENT)
Dept: GENERAL RADIOLOGY | Age: 48
End: 2023-08-01
Payer: MEDICARE

## 2023-08-01 ENCOUNTER — HOSPITAL ENCOUNTER (EMERGENCY)
Age: 48
Discharge: HOME | End: 2023-08-01
Attending: STUDENT IN AN ORGANIZED HEALTH CARE EDUCATION/TRAINING PROGRAM
Payer: MEDICARE

## 2023-08-01 VITALS
DIASTOLIC BLOOD PRESSURE: 71 MMHG | RESPIRATION RATE: 18 BRPM | HEART RATE: 85 BPM | SYSTOLIC BLOOD PRESSURE: 120 MMHG | OXYGEN SATURATION: 95 % | HEIGHT: 65 IN | TEMPERATURE: 98.7 F | BODY MASS INDEX: 34.16 KG/M2 | WEIGHT: 205 LBS

## 2023-08-01 DIAGNOSIS — R07.89 CHEST WALL PAIN: Primary | ICD-10-CM

## 2023-08-01 DIAGNOSIS — R07.89 ATYPICAL CHEST PAIN: ICD-10-CM

## 2023-08-01 DIAGNOSIS — F41.1 ANXIETY STATE: ICD-10-CM

## 2023-08-01 LAB
ALBUMIN SERPL-MCNC: 3.7 G/DL (ref 3.5–4.6)
ALP SERPL-CCNC: 89 U/L (ref 40–130)
ALT SERPL-CCNC: 20 U/L (ref 0–33)
ANION GAP SERPL CALCULATED.3IONS-SCNC: 9 MEQ/L (ref 9–15)
AST SERPL-CCNC: 21 U/L (ref 0–35)
BASOPHILS # BLD: 0 K/UL (ref 0–0.2)
BASOPHILS NFR BLD: 0.3 %
BILIRUB SERPL-MCNC: 0.4 MG/DL (ref 0.2–0.7)
BUN SERPL-MCNC: 4 MG/DL (ref 6–20)
CALCIUM SERPL-MCNC: 8.4 MG/DL (ref 8.5–9.9)
CHLORIDE SERPL-SCNC: 105 MEQ/L (ref 95–107)
CO2 SERPL-SCNC: 26 MEQ/L (ref 20–31)
CREAT SERPL-MCNC: 0.67 MG/DL (ref 0.5–0.9)
EKG ATRIAL RATE: 94 BPM
EKG P AXIS: 50 DEGREES
EKG P-R INTERVAL: 168 MS
EKG Q-T INTERVAL: 334 MS
EKG QRS DURATION: 94 MS
EKG QTC CALCULATION (BAZETT): 417 MS
EKG R AXIS: 15 DEGREES
EKG T AXIS: 21 DEGREES
EKG VENTRICULAR RATE: 94 BPM
EOSINOPHIL # BLD: 0.2 K/UL (ref 0–0.7)
EOSINOPHIL NFR BLD: 1.6 %
ERYTHROCYTE [DISTWIDTH] IN BLOOD BY AUTOMATED COUNT: 13.6 % (ref 11.5–14.5)
GLOBULIN SER CALC-MCNC: 3.5 G/DL (ref 2.3–3.5)
GLUCOSE SERPL-MCNC: 110 MG/DL (ref 70–99)
HCT VFR BLD AUTO: 37.6 % (ref 37–47)
HGB BLD-MCNC: 12.6 G/DL (ref 12–16)
LACTATE BLDV-SCNC: 1.4 MMOL/L (ref 0.5–2.2)
LYMPHOCYTES # BLD: 0.9 K/UL (ref 1–4.8)
LYMPHOCYTES NFR BLD: 9 %
MAGNESIUM SERPL-MCNC: 2.1 MG/DL (ref 1.7–2.4)
MCH RBC QN AUTO: 27.8 PG (ref 27–31.3)
MCHC RBC AUTO-ENTMCNC: 33.5 % (ref 33–37)
MCV RBC AUTO: 82.9 FL (ref 79.4–94.8)
MONOCYTES # BLD: 0.8 K/UL (ref 0.2–0.8)
MONOCYTES NFR BLD: 8.8 %
NEUTROPHILS # BLD: 7.7 K/UL (ref 1.4–6.5)
NEUTS SEG NFR BLD: 80.3 %
PLATELET # BLD AUTO: 285 K/UL (ref 130–400)
POTASSIUM SERPL-SCNC: 3.8 MEQ/L (ref 3.4–4.9)
PROT SERPL-MCNC: 7.2 G/DL (ref 6.3–8)
RBC # BLD AUTO: 4.54 M/UL (ref 4.2–5.4)
SODIUM SERPL-SCNC: 140 MEQ/L (ref 135–144)
TROPONIN T SERPL-MCNC: <0.01 NG/ML (ref 0–0.01)
WBC # BLD AUTO: 9.5 K/UL (ref 4.8–10.8)

## 2023-08-01 PROCEDURE — 93005 ELECTROCARDIOGRAM TRACING: CPT | Performed by: STUDENT IN AN ORGANIZED HEALTH CARE EDUCATION/TRAINING PROGRAM

## 2023-08-01 PROCEDURE — 96374 THER/PROPH/DIAG INJ IV PUSH: CPT

## 2023-08-01 PROCEDURE — 80053 COMPREHEN METABOLIC PANEL: CPT

## 2023-08-01 PROCEDURE — 84484 ASSAY OF TROPONIN QUANT: CPT

## 2023-08-01 PROCEDURE — 85025 COMPLETE CBC W/AUTO DIFF WBC: CPT

## 2023-08-01 PROCEDURE — 83735 ASSAY OF MAGNESIUM: CPT

## 2023-08-01 PROCEDURE — 99285 EMERGENCY DEPT VISIT HI MDM: CPT

## 2023-08-01 PROCEDURE — 6370000000 HC RX 637 (ALT 250 FOR IP): Performed by: STUDENT IN AN ORGANIZED HEALTH CARE EDUCATION/TRAINING PROGRAM

## 2023-08-01 PROCEDURE — 96361 HYDRATE IV INFUSION ADD-ON: CPT

## 2023-08-01 PROCEDURE — 2580000003 HC RX 258: Performed by: STUDENT IN AN ORGANIZED HEALTH CARE EDUCATION/TRAINING PROGRAM

## 2023-08-01 PROCEDURE — 83605 ASSAY OF LACTIC ACID: CPT

## 2023-08-01 PROCEDURE — 36415 COLL VENOUS BLD VENIPUNCTURE: CPT

## 2023-08-01 PROCEDURE — 71045 X-RAY EXAM CHEST 1 VIEW: CPT

## 2023-08-01 PROCEDURE — 93010 ELECTROCARDIOGRAM REPORT: CPT | Performed by: INTERNAL MEDICINE

## 2023-08-01 PROCEDURE — 6360000002 HC RX W HCPCS: Performed by: STUDENT IN AN ORGANIZED HEALTH CARE EDUCATION/TRAINING PROGRAM

## 2023-08-01 RX ORDER — ACETAMINOPHEN 500 MG
1000 TABLET ORAL ONCE
Status: COMPLETED | OUTPATIENT
Start: 2023-08-01 | End: 2023-08-01

## 2023-08-01 RX ORDER — KETOROLAC TROMETHAMINE 15 MG/ML
15 INJECTION, SOLUTION INTRAMUSCULAR; INTRAVENOUS ONCE
Status: COMPLETED | OUTPATIENT
Start: 2023-08-01 | End: 2023-08-01

## 2023-08-01 RX ORDER — 0.9 % SODIUM CHLORIDE 0.9 %
1000 INTRAVENOUS SOLUTION INTRAVENOUS ONCE
Status: COMPLETED | OUTPATIENT
Start: 2023-08-01 | End: 2023-08-01

## 2023-08-01 RX ADMIN — ACETAMINOPHEN 1000 MG: 500 TABLET ORAL at 07:54

## 2023-08-01 RX ADMIN — SODIUM CHLORIDE 1000 ML: 9 INJECTION, SOLUTION INTRAVENOUS at 07:53

## 2023-08-01 RX ADMIN — KETOROLAC TROMETHAMINE 15 MG: 15 INJECTION, SOLUTION INTRAMUSCULAR; INTRAVENOUS at 07:54

## 2023-08-01 ASSESSMENT — PAIN DESCRIPTION - LOCATION
LOCATION: HEAD
LOCATION: CHEST

## 2023-08-01 ASSESSMENT — PAIN DESCRIPTION - FREQUENCY: FREQUENCY: INTERMITTENT

## 2023-08-01 ASSESSMENT — PAIN DESCRIPTION - DESCRIPTORS
DESCRIPTORS: ACHING
DESCRIPTORS: ACHING

## 2023-08-01 ASSESSMENT — PAIN SCALES - GENERAL
PAINLEVEL_OUTOF10: 10
PAINLEVEL_OUTOF10: 10

## 2023-08-01 ASSESSMENT — PAIN - FUNCTIONAL ASSESSMENT
PAIN_FUNCTIONAL_ASSESSMENT: 0-10
PAIN_FUNCTIONAL_ASSESSMENT: NONE - DENIES PAIN

## 2023-08-01 ASSESSMENT — PAIN DESCRIPTION - PAIN TYPE: TYPE: ACUTE PAIN

## 2023-08-01 NOTE — ED PROVIDER NOTES
clinically significant/life threatening deterioration in the patient's condition which required my urgent intervention. FINAL IMPRESSION    No diagnosis found.       DISPOSITION/PLAN   DISPOSITION        Current Discharge Medication List           Yoselin Clinton MD

## 2023-08-01 NOTE — ED NOTES
Discharge instructions reviewed with patient. Patient denies any further questions at this time. Pt encouraged to make follow up appointments with PCP and any speciality referrals.         Viktoriya Day RN  08/01/23 3107

## 2023-10-11 ENCOUNTER — HOSPITAL ENCOUNTER (EMERGENCY)
Age: 48
Discharge: HOME | End: 2023-10-11
Payer: MEDICARE

## 2023-10-11 VITALS
BODY MASS INDEX: 33.32 KG/M2 | OXYGEN SATURATION: 98 % | WEIGHT: 200 LBS | SYSTOLIC BLOOD PRESSURE: 145 MMHG | HEIGHT: 65 IN | DIASTOLIC BLOOD PRESSURE: 98 MMHG | HEART RATE: 97 BPM | RESPIRATION RATE: 20 BRPM

## 2023-10-11 DIAGNOSIS — M54.50 ACUTE LEFT-SIDED LOW BACK PAIN, UNSPECIFIED WHETHER SCIATICA PRESENT: Primary | ICD-10-CM

## 2023-10-11 DIAGNOSIS — M25.562 ACUTE PAIN OF LEFT KNEE: ICD-10-CM

## 2023-10-11 PROCEDURE — 6360000002 HC RX W HCPCS: Performed by: NURSE PRACTITIONER

## 2023-10-11 PROCEDURE — 6370000000 HC RX 637 (ALT 250 FOR IP): Performed by: NURSE PRACTITIONER

## 2023-10-11 PROCEDURE — 96372 THER/PROPH/DIAG INJ SC/IM: CPT

## 2023-10-11 PROCEDURE — 99284 EMERGENCY DEPT VISIT MOD MDM: CPT

## 2023-10-11 RX ORDER — HYDROCODONE BITARTRATE AND ACETAMINOPHEN 5; 325 MG/1; MG/1
1 TABLET ORAL ONCE
Status: COMPLETED | OUTPATIENT
Start: 2023-10-11 | End: 2023-10-11

## 2023-10-11 RX ORDER — ORPHENADRINE CITRATE 30 MG/ML
60 INJECTION INTRAMUSCULAR; INTRAVENOUS ONCE
Status: COMPLETED | OUTPATIENT
Start: 2023-10-11 | End: 2023-10-11

## 2023-10-11 RX ADMIN — ORPHENADRINE CITRATE 60 MG: 60 INJECTION INTRAMUSCULAR; INTRAVENOUS at 18:59

## 2023-10-11 RX ADMIN — HYDROCODONE BITARTRATE AND ACETAMINOPHEN 1 TABLET: 5; 325 TABLET ORAL at 18:59

## 2023-10-11 ASSESSMENT — ENCOUNTER SYMPTOMS
ABDOMINAL PAIN: 0
BACK PAIN: 1
SHORTNESS OF BREATH: 0
COUGH: 0

## 2023-10-11 ASSESSMENT — PATIENT HEALTH QUESTIONNAIRE - PHQ9
2. FEELING DOWN, DEPRESSED OR HOPELESS: 0
1. LITTLE INTEREST OR PLEASURE IN DOING THINGS: 0
SUM OF ALL RESPONSES TO PHQ QUESTIONS 1-9: 0
SUM OF ALL RESPONSES TO PHQ9 QUESTIONS 1 & 2: 0
SUM OF ALL RESPONSES TO PHQ QUESTIONS 1-9: 0

## 2023-10-11 ASSESSMENT — PAIN SCALES - GENERAL
PAINLEVEL_OUTOF10: 8
PAINLEVEL_OUTOF10: 7

## 2023-10-11 ASSESSMENT — PAIN - FUNCTIONAL ASSESSMENT: PAIN_FUNCTIONAL_ASSESSMENT: 0-10

## 2023-10-11 ASSESSMENT — PAIN DESCRIPTION - LOCATION: LOCATION: GENERALIZED

## 2023-10-11 ASSESSMENT — PAIN DESCRIPTION - DESCRIPTORS: DESCRIPTORS: ACHING

## 2023-10-11 NOTE — ED TRIAGE NOTES
Pt presents to Er from home with arthritis flare up. Pt does  not get her infusion till December and does not see rheumatology till November. Pt has been taking ibuprofen and Tylenol but it has not helped. Pt is A&Ox4, warm and dry at this time.

## 2023-10-11 NOTE — ED PROVIDER NOTES
CLONAZEPAM (KLONOPIN) 0.5 MG TABLET    Take 0.5 mg by mouth 3 times daily as needed. DICLOFENAC (VOLTAREN) 50 MG EC TABLET        DICLOFENAC SODIUM 1 % GEL    Apply 4 g topically 4 times daily    DOXEPIN (SINEQUAN) 10 MG CAPSULE        DULOXETINE (CYMBALTA) 30 MG EXTENDED RELEASE CAPSULE    Take 1 capsule by mouth 2 times daily Start by taking one 30 mg capsule once daily x 7 days, then twice daily thereafter    GABAPENTIN (NEURONTIN) 100 MG CAPSULE        HYDROXYCHLOROQUINE (PLAQUENIL) 200 MG TABLET    Take 1 tablet by mouth 2 times daily (with meals)    IBUPROFEN (IBU) 800 MG TABLET    Take 1 tablet by mouth every 8 hours as needed for Pain    MIRTAZAPINE (REMERON) 45 MG TABLET    Take 1 tablet by mouth nightly    OLANZAPINE (ZYPREXA) 5 MG TABLET        OMEPRAZOLE (PRILOSEC) 20 MG DELAYED RELEASE CAPSULE    Take 1 capsule by mouth every morning (before breakfast)    PRAZOSIN (MINIPRESS) 2 MG CAPSULE    Take 1 capsule by mouth nightly    RIMEGEPANT SULFATE (NURTEC) 75 MG TBDP    Take 75 mg by mouth daily as needed (migraine)       ALLERGIES     Isoniazid, Adalimumab, Buspirone, Morphine, Phentermine, Quetiapine, and Methotrexate    FAMILY HISTORY       Family History   Problem Relation Age of Onset    Colon Cancer Maternal Grandmother     Breast Cancer Neg Hx           SOCIAL HISTORY       Social History     Socioeconomic History    Marital status:    Tobacco Use    Smoking status: Former     Packs/day: 0.00     Years: 1.00     Additional pack years: 0.00     Total pack years: 0.00     Types: Cigarettes     Quit date: 3/29/2023     Years since quittin.5    Smokeless tobacco: Never   Vaping Use    Vaping Use: Never used   Substance and Sexual Activity    Alcohol use: No    Drug use: Not Currently     Types:  Other-see comments     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

## 2024-01-31 ENCOUNTER — APPOINTMENT (OUTPATIENT)
Dept: CT IMAGING | Age: 49
End: 2024-01-31
Payer: COMMERCIAL

## 2024-01-31 ENCOUNTER — HOSPITAL ENCOUNTER (EMERGENCY)
Age: 49
Discharge: HOME | End: 2024-01-31
Payer: COMMERCIAL

## 2024-01-31 VITALS
HEART RATE: 62 BPM | TEMPERATURE: 98.1 F | RESPIRATION RATE: 14 BRPM | SYSTOLIC BLOOD PRESSURE: 144 MMHG | DIASTOLIC BLOOD PRESSURE: 94 MMHG | OXYGEN SATURATION: 100 % | WEIGHT: 190 LBS | HEIGHT: 66 IN | BODY MASS INDEX: 30.53 KG/M2

## 2024-01-31 DIAGNOSIS — J18.9 PNEUMONIA OF LEFT LOWER LOBE DUE TO INFECTIOUS ORGANISM: Primary | ICD-10-CM

## 2024-01-31 LAB
ALBUMIN SERPL-MCNC: 3.8 G/DL (ref 3.5–4.6)
ALP SERPL-CCNC: 104 U/L (ref 40–130)
ALT SERPL-CCNC: 9 U/L (ref 0–33)
ANION GAP SERPL CALCULATED.3IONS-SCNC: 9 MEQ/L (ref 9–15)
AST SERPL-CCNC: 13 U/L (ref 0–35)
BASOPHILS # BLD: 0 K/UL (ref 0–0.2)
BASOPHILS NFR BLD: 0.4 %
BILIRUB SERPL-MCNC: 0.3 MG/DL (ref 0.2–0.7)
BUN SERPL-MCNC: 12 MG/DL (ref 6–20)
CALCIUM SERPL-MCNC: 9 MG/DL (ref 8.5–9.9)
CHLORIDE SERPL-SCNC: 105 MEQ/L (ref 95–107)
CO2 SERPL-SCNC: 26 MEQ/L (ref 20–31)
CREAT SERPL-MCNC: 0.71 MG/DL (ref 0.5–0.9)
EKG ATRIAL RATE: 67 BPM
EKG P AXIS: 17 DEGREES
EKG P-R INTERVAL: 148 MS
EKG Q-T INTERVAL: 394 MS
EKG QRS DURATION: 96 MS
EKG QTC CALCULATION (BAZETT): 416 MS
EKG R AXIS: 25 DEGREES
EKG T AXIS: 17 DEGREES
EKG VENTRICULAR RATE: 67 BPM
EOSINOPHIL # BLD: 0.2 K/UL (ref 0–0.7)
EOSINOPHIL NFR BLD: 2.2 %
ERYTHROCYTE [DISTWIDTH] IN BLOOD BY AUTOMATED COUNT: 13 % (ref 11.5–14.5)
GLOBULIN SER CALC-MCNC: 3.6 G/DL (ref 2.3–3.5)
GLUCOSE SERPL-MCNC: 101 MG/DL (ref 70–99)
HCT VFR BLD AUTO: 41.1 % (ref 37–47)
HGB BLD-MCNC: 13.1 G/DL (ref 12–16)
LYMPHOCYTES # BLD: 1.4 K/UL (ref 1–4.8)
LYMPHOCYTES NFR BLD: 16.7 %
MAGNESIUM SERPL-MCNC: 2.1 MG/DL (ref 1.7–2.4)
MCH RBC QN AUTO: 27.5 PG (ref 27–31.3)
MCHC RBC AUTO-ENTMCNC: 31.9 % (ref 33–37)
MCV RBC AUTO: 86.3 FL (ref 79.4–94.8)
MONOCYTES # BLD: 0.6 K/UL (ref 0.2–0.8)
MONOCYTES NFR BLD: 6.9 %
NEUTROPHILS # BLD: 5.9 K/UL (ref 1.4–6.5)
NEUTS SEG NFR BLD: 73.4 %
PLATELET # BLD AUTO: 337 K/UL (ref 130–400)
POC CREATININE WHOLE BLOOD: 0.9
POTASSIUM SERPL-SCNC: 4.6 MEQ/L (ref 3.4–4.9)
PROT SERPL-MCNC: 7.4 G/DL (ref 6.3–8)
RBC # BLD AUTO: 4.76 M/UL (ref 4.2–5.4)
SODIUM SERPL-SCNC: 140 MEQ/L (ref 135–144)
TROPONIN, HIGH SENSITIVITY: <6 NG/L (ref 0–19)
TROPONIN, HIGH SENSITIVITY: <6 NG/L (ref 0–19)
WBC # BLD AUTO: 8.1 K/UL (ref 4.8–10.8)

## 2024-01-31 PROCEDURE — 83735 ASSAY OF MAGNESIUM: CPT

## 2024-01-31 PROCEDURE — 36415 COLL VENOUS BLD VENIPUNCTURE: CPT

## 2024-01-31 PROCEDURE — 99285 EMERGENCY DEPT VISIT HI MDM: CPT | Performed by: PHYSICIAN ASSISTANT

## 2024-01-31 PROCEDURE — 93005 ELECTROCARDIOGRAM TRACING: CPT | Performed by: EMERGENCY MEDICINE

## 2024-01-31 PROCEDURE — 96375 TX/PRO/DX INJ NEW DRUG ADDON: CPT | Performed by: PHYSICIAN ASSISTANT

## 2024-01-31 PROCEDURE — 2580000003 HC RX 258: Performed by: PHYSICIAN ASSISTANT

## 2024-01-31 PROCEDURE — 80053 COMPREHEN METABOLIC PANEL: CPT

## 2024-01-31 PROCEDURE — 71275 CT ANGIOGRAPHY CHEST: CPT

## 2024-01-31 PROCEDURE — 84484 ASSAY OF TROPONIN QUANT: CPT

## 2024-01-31 PROCEDURE — 6360000002 HC RX W HCPCS: Performed by: PHYSICIAN ASSISTANT

## 2024-01-31 PROCEDURE — 6360000004 HC RX CONTRAST MEDICATION: Performed by: PHYSICIAN ASSISTANT

## 2024-01-31 PROCEDURE — 96374 THER/PROPH/DIAG INJ IV PUSH: CPT | Performed by: PHYSICIAN ASSISTANT

## 2024-01-31 PROCEDURE — 85025 COMPLETE CBC W/AUTO DIFF WBC: CPT

## 2024-01-31 RX ORDER — ORPHENADRINE CITRATE 30 MG/ML
60 INJECTION INTRAMUSCULAR; INTRAVENOUS ONCE
Status: COMPLETED | OUTPATIENT
Start: 2024-01-31 | End: 2024-01-31

## 2024-01-31 RX ORDER — 0.9 % SODIUM CHLORIDE 0.9 %
1000 INTRAVENOUS SOLUTION INTRAVENOUS ONCE
Status: COMPLETED | OUTPATIENT
Start: 2024-01-31 | End: 2024-01-31

## 2024-01-31 RX ORDER — KETOROLAC TROMETHAMINE 30 MG/ML
30 INJECTION, SOLUTION INTRAMUSCULAR; INTRAVENOUS ONCE
Status: COMPLETED | OUTPATIENT
Start: 2024-01-31 | End: 2024-01-31

## 2024-01-31 RX ORDER — AMOXICILLIN AND CLAVULANATE POTASSIUM 875; 125 MG/1; MG/1
1 TABLET, FILM COATED ORAL 2 TIMES DAILY
Qty: 20 TABLET | Refills: 0 | Status: SHIPPED | OUTPATIENT
Start: 2024-01-31 | End: 2024-02-10

## 2024-01-31 RX ORDER — BENZONATATE 100 MG/1
100 CAPSULE ORAL 3 TIMES DAILY PRN
Qty: 20 CAPSULE | Refills: 0 | Status: SHIPPED | OUTPATIENT
Start: 2024-01-31 | End: 2024-02-07

## 2024-01-31 RX ORDER — AZITHROMYCIN 250 MG/1
TABLET, FILM COATED ORAL
Qty: 1 PACKET | Refills: 0 | Status: SHIPPED | OUTPATIENT
Start: 2024-01-31 | End: 2024-02-04

## 2024-01-31 RX ADMIN — SODIUM CHLORIDE 1000 ML: 9 INJECTION, SOLUTION INTRAVENOUS at 08:04

## 2024-01-31 RX ADMIN — ORPHENADRINE CITRATE 60 MG: 60 INJECTION INTRAMUSCULAR; INTRAVENOUS at 08:04

## 2024-01-31 RX ADMIN — KETOROLAC TROMETHAMINE 30 MG: 30 INJECTION, SOLUTION INTRAMUSCULAR at 08:05

## 2024-01-31 RX ADMIN — IOPAMIDOL 75 ML: 612 INJECTION, SOLUTION INTRAVENOUS at 08:27

## 2024-01-31 ASSESSMENT — ENCOUNTER SYMPTOMS
SORE THROAT: 0
COUGH: 0
EYE PAIN: 0
PHOTOPHOBIA: 0
SHORTNESS OF BREATH: 0
NAUSEA: 0
DIARRHEA: 0
ABDOMINAL PAIN: 0
RHINORRHEA: 0
BACK PAIN: 1
VOMITING: 0

## 2024-01-31 ASSESSMENT — PAIN DESCRIPTION - DESCRIPTORS: DESCRIPTORS: DISCOMFORT

## 2024-01-31 ASSESSMENT — PAIN DESCRIPTION - PAIN TYPE: TYPE: ACUTE PAIN

## 2024-01-31 ASSESSMENT — PAIN SCALES - GENERAL
PAINLEVEL_OUTOF10: 10
PAINLEVEL_OUTOF10: 10

## 2024-01-31 ASSESSMENT — LIFESTYLE VARIABLES
HOW MANY STANDARD DRINKS CONTAINING ALCOHOL DO YOU HAVE ON A TYPICAL DAY: PATIENT DOES NOT DRINK
HOW OFTEN DO YOU HAVE A DRINK CONTAINING ALCOHOL: NEVER

## 2024-01-31 ASSESSMENT — PAIN DESCRIPTION - LOCATION
LOCATION: CHEST;RIB CAGE
LOCATION: RIB CAGE

## 2024-01-31 ASSESSMENT — PAIN - FUNCTIONAL ASSESSMENT: PAIN_FUNCTIONAL_ASSESSMENT: 0-10

## 2024-01-31 ASSESSMENT — PAIN DESCRIPTION - FREQUENCY: FREQUENCY: CONTINUOUS

## 2024-01-31 ASSESSMENT — PAIN DESCRIPTION - ORIENTATION
ORIENTATION: LEFT
ORIENTATION: LEFT

## 2024-01-31 NOTE — ED PROVIDER NOTES
Physician - none    LABS:  Labs Reviewed   CBC WITH AUTO DIFFERENTIAL - Abnormal; Notable for the following components:       Result Value    MCHC 31.9 (*)     All other components within normal limits   COMPREHENSIVE METABOLIC PANEL - Abnormal; Notable for the following components:    Glucose 101 (*)     Globulin 3.6 (*)     All other components within normal limits   POCT CREATININE - Normal   TROPONIN   TROPONIN   MAGNESIUM       All other labs were within normal range or not returned as of this dictation.    EMERGENCY DEPARTMENT COURSE and DIFFERENTIAL DIAGNOSIS/MDM:   Vitals:    Vitals:    01/31/24 0724 01/31/24 0830   BP: (!) 151/90 (!) 144/94   Pulse: 73 62   Resp: 17 14   Temp: 98.1 °F (36.7 °C)    TempSrc: Oral    SpO2: 97% 100%   Weight: 86.2 kg (190 lb)    Height: 1.676 m (5' 6\")            MDM    Patient presents to the emergency department with cough left-sided chest pain.  Pain is reproducible with palpation worse with inspiration.  She is afebrile and hemodynamically stable.  However she did have recent travel to [place] by airplane so there is concern for to rule out a pulmonary embolus.  Patient has no leukocytosis.  CTA of the chest shows left lower lobe subatelectasis/pneumonia.  With cough recent travel immunocompromised will cover with Augmentin and Z-Rafiq for a pneumonia and have her follow-up with the family doctor and return to the ER for new worsening or concerning symptoms.  EKG showed no ischemic changes troponin x 2 less than 6 with no significant delta change a low suspicion that this is ACS at this time.  Vies return to the ER for any worse or concerning symptoms.  Patient verbalized understanding patient safe for discharge.    REASSESSMENT          CRITICAL CARE TIME   Total Critical Care time was  minutes, excluding separatelyreportable procedures.  There was a high probability ofclinically significant/life threatening deterioration in the patient's condition which required my

## 2024-02-02 LAB
PERFORMED ON: NORMAL
POC CREATININE: 0.9 MG/DL (ref 0.6–1.2)
POC SAMPLE TYPE: NORMAL

## 2024-05-15 ENCOUNTER — HOSPITAL ENCOUNTER (EMERGENCY)
Age: 49
Discharge: HOME | End: 2024-05-15
Attending: EMERGENCY MEDICINE
Payer: COMMERCIAL

## 2024-05-15 ENCOUNTER — APPOINTMENT (OUTPATIENT)
Dept: CT IMAGING | Age: 49
End: 2024-05-15
Payer: COMMERCIAL

## 2024-05-15 VITALS
WEIGHT: 191 LBS | BODY MASS INDEX: 30.7 KG/M2 | DIASTOLIC BLOOD PRESSURE: 86 MMHG | RESPIRATION RATE: 19 BRPM | SYSTOLIC BLOOD PRESSURE: 142 MMHG | HEART RATE: 74 BPM | HEIGHT: 66 IN | OXYGEN SATURATION: 99 % | TEMPERATURE: 98 F

## 2024-05-15 DIAGNOSIS — J01.20 ACUTE ETHMOIDAL SINUSITIS, RECURRENCE NOT SPECIFIED: Primary | ICD-10-CM

## 2024-05-15 DIAGNOSIS — H70.93 MASTOIDITIS OF BOTH SIDES: ICD-10-CM

## 2024-05-15 LAB
ALBUMIN SERPL-MCNC: 3.7 G/DL (ref 3.5–4.6)
ALP SERPL-CCNC: 113 U/L (ref 40–130)
ALT SERPL-CCNC: 11 U/L (ref 0–33)
ANION GAP SERPL CALCULATED.3IONS-SCNC: 9 MEQ/L (ref 9–15)
AST SERPL-CCNC: 14 U/L (ref 0–35)
BASOPHILS # BLD: 0 K/UL (ref 0–0.2)
BASOPHILS NFR BLD: 0.4 %
BILIRUB SERPL-MCNC: 0.3 MG/DL (ref 0.2–0.7)
BUN SERPL-MCNC: 10 MG/DL (ref 6–20)
CALCIUM SERPL-MCNC: 8.8 MG/DL (ref 8.5–9.9)
CHLORIDE SERPL-SCNC: 103 MEQ/L (ref 95–107)
CO2 SERPL-SCNC: 26 MEQ/L (ref 20–31)
CREAT SERPL-MCNC: 0.65 MG/DL (ref 0.5–0.9)
EOSINOPHIL # BLD: 0.3 K/UL (ref 0–0.7)
EOSINOPHIL NFR BLD: 3.3 %
ERYTHROCYTE [DISTWIDTH] IN BLOOD BY AUTOMATED COUNT: 12.4 % (ref 11.5–14.5)
GLOBULIN SER CALC-MCNC: 3.8 G/DL (ref 2.3–3.5)
GLUCOSE SERPL-MCNC: 106 MG/DL (ref 70–99)
HCT VFR BLD AUTO: 39.9 % (ref 37–47)
HGB BLD-MCNC: 13.2 G/DL (ref 12–16)
LYMPHOCYTES # BLD: 1.7 K/UL (ref 1–4.8)
LYMPHOCYTES NFR BLD: 21.1 %
MAGNESIUM SERPL-MCNC: 2 MG/DL (ref 1.7–2.4)
MCH RBC QN AUTO: 27.3 PG (ref 27–31.3)
MCHC RBC AUTO-ENTMCNC: 33.1 % (ref 33–37)
MCV RBC AUTO: 82.6 FL (ref 79.4–94.8)
MONOCYTES # BLD: 0.4 K/UL (ref 0.2–0.8)
MONOCYTES NFR BLD: 5.4 %
NEUTROPHILS # BLD: 5.6 K/UL (ref 1.4–6.5)
NEUTS SEG NFR BLD: 69.4 %
PLATELET # BLD AUTO: 329 K/UL (ref 130–400)
POTASSIUM SERPL-SCNC: 4 MEQ/L (ref 3.4–4.9)
PROT SERPL-MCNC: 7.5 G/DL (ref 6.3–8)
RBC # BLD AUTO: 4.83 M/UL (ref 4.2–5.4)
SODIUM SERPL-SCNC: 138 MEQ/L (ref 135–144)
TSH SERPL-MCNC: 0.65 UIU/ML (ref 0.44–3.86)
WBC # BLD AUTO: 8.1 K/UL (ref 4.8–10.8)

## 2024-05-15 PROCEDURE — 36415 COLL VENOUS BLD VENIPUNCTURE: CPT

## 2024-05-15 PROCEDURE — 2580000003 HC RX 258: Performed by: EMERGENCY MEDICINE

## 2024-05-15 PROCEDURE — 80053 COMPREHEN METABOLIC PANEL: CPT

## 2024-05-15 PROCEDURE — 99284 EMERGENCY DEPT VISIT MOD MDM: CPT

## 2024-05-15 PROCEDURE — 93005 ELECTROCARDIOGRAM TRACING: CPT | Performed by: EMERGENCY MEDICINE

## 2024-05-15 PROCEDURE — 83735 ASSAY OF MAGNESIUM: CPT

## 2024-05-15 PROCEDURE — 85025 COMPLETE CBC W/AUTO DIFF WBC: CPT

## 2024-05-15 PROCEDURE — 84443 ASSAY THYROID STIM HORMONE: CPT

## 2024-05-15 PROCEDURE — 6370000000 HC RX 637 (ALT 250 FOR IP): Performed by: EMERGENCY MEDICINE

## 2024-05-15 PROCEDURE — 70450 CT HEAD/BRAIN W/O DYE: CPT

## 2024-05-15 RX ORDER — AMOXICILLIN AND CLAVULANATE POTASSIUM 875; 125 MG/1; MG/1
1 TABLET, FILM COATED ORAL ONCE
Status: COMPLETED | OUTPATIENT
Start: 2024-05-15 | End: 2024-05-15

## 2024-05-15 RX ORDER — AMOXICILLIN AND CLAVULANATE POTASSIUM 875; 125 MG/1; MG/1
1 TABLET, FILM COATED ORAL 2 TIMES DAILY
Qty: 24 TABLET | Refills: 0 | Status: SHIPPED | OUTPATIENT
Start: 2024-05-15 | End: 2024-05-27

## 2024-05-15 RX ORDER — 0.9 % SODIUM CHLORIDE 0.9 %
1000 INTRAVENOUS SOLUTION INTRAVENOUS ONCE
Status: COMPLETED | OUTPATIENT
Start: 2024-05-15 | End: 2024-05-15

## 2024-05-15 RX ADMIN — SODIUM CHLORIDE 1000 ML: 9 INJECTION, SOLUTION INTRAVENOUS at 11:44

## 2024-05-15 RX ADMIN — AMOXICILLIN AND CLAVULANATE POTASSIUM 1 TABLET: 875; 125 TABLET, FILM COATED ORAL at 13:43

## 2024-05-15 ASSESSMENT — ENCOUNTER SYMPTOMS
ABDOMINAL PAIN: 0
SHORTNESS OF BREATH: 0
SORE THROAT: 0
EYE DISCHARGE: 0
CHEST TIGHTNESS: 0
COUGH: 0
WHEEZING: 0
PHOTOPHOBIA: 0
ABDOMINAL DISTENTION: 0
VOMITING: 0

## 2024-05-15 ASSESSMENT — PAIN SCALES - GENERAL: PAINLEVEL_OUTOF10: 5

## 2024-05-15 ASSESSMENT — PAIN - FUNCTIONAL ASSESSMENT: PAIN_FUNCTIONAL_ASSESSMENT: 0-10

## 2024-05-15 ASSESSMENT — PAIN DESCRIPTION - PAIN TYPE: TYPE: CHRONIC PAIN

## 2024-05-15 NOTE — ED PROVIDER NOTES
urinating.   Musculoskeletal:  Negative for arthralgias.   Skin:  Negative for pallor and rash.   Allergic/Immunologic: Negative for immunocompromised state.   Neurological:  Positive for dizziness, light-headedness and headaches. Negative for syncope.   Hematological:  Negative for adenopathy.   Psychiatric/Behavioral:  Negative for agitation and hallucinations.    All other systems reviewed and are negative.      Except as noted above the remainder of the review of systems was reviewed and negative.       PAST MEDICAL HISTORY     Past Medical History:   Diagnosis Date    Abnormal Pap smear of cervix     LAVH    Arthritis     Asthma     Breast disorder     Depression     Hepatitis C     Rheumatoid arteritis (H[facility])          SURGICALHISTORY       Past Surgical History:   Procedure Laterality Date    HYSTERECTOMY (CERVIX STATUS UNKNOWN)      LUNG SURGERY      TUBAL LIGATION           CURRENT MEDICATIONS       Previous Medications    ALBUTEROL SULFATE  (90 BASE) MCG/ACT INHALER    Inhale 2 puffs into the lungs every 6 hours as needed    ALBUTEROL-IPRATROPIUM (COMBIVENT RESPIMAT)  MCG/ACT AERS INHALER    Inhale into the lungs    ALPRAZOLAM (XANAX) 0.25 MG TABLET    Take 1 tablet by mouth 3 times daily as needed for Anxiety.    ATOGEPANT (QULIPTA) 60 MG TABS    Take 60 mg by mouth daily    CARIPRAZINE HCL (VRAYLAR) 1.5 MG CAPSULE    Take 1 capsule by mouth daily    CLONAZEPAM (KLONOPIN) 0.5 MG TABLET    Take 0.5 mg by mouth 3 times daily as needed.     DICLOFENAC (VOLTAREN) 50 MG EC TABLET        DICLOFENAC SODIUM 1 % GEL    Apply 4 g topically 4 times daily    DOXEPIN (SINEQUAN) 10 MG CAPSULE        DULOXETINE (CYMBALTA) 30 MG EXTENDED RELEASE CAPSULE    Take 1 capsule by mouth 2 times daily Start by taking one 30 mg capsule once daily x 7 days, then twice daily thereafter    GABAPENTIN (NEURONTIN) 100 MG CAPSULE        HYDROXYCHLOROQUINE (PLAQUENIL) 200 MG TABLET    Take 1 tablet by mouth 2 times daily

## 2024-05-15 NOTE — ED NOTES
Patient resting in the bed with the lights on, respirations even and unlabored at this time, call light with in reach, family at the bedside

## 2024-05-15 NOTE — ED TRIAGE NOTES
Patient arrived via EMS due to dizziness, headache and chest pain. All complaints are chronic. Patient ois seeing neuro and has a cardiology appointment but it is scheduled far out. While laying she is feeling better but with position change the dizziness is worse.

## 2024-05-16 LAB
EKG ATRIAL RATE: 74 BPM
EKG P AXIS: 19 DEGREES
EKG P-R INTERVAL: 156 MS
EKG Q-T INTERVAL: 392 MS
EKG QRS DURATION: 94 MS
EKG QTC CALCULATION (BAZETT): 435 MS
EKG R AXIS: 17 DEGREES
EKG T AXIS: 20 DEGREES
EKG VENTRICULAR RATE: 74 BPM

## 2024-05-16 PROCEDURE — 93010 ELECTROCARDIOGRAM REPORT: CPT | Performed by: INTERNAL MEDICINE

## 2024-10-09 ENCOUNTER — HOSPITAL ENCOUNTER (EMERGENCY)
Age: 49
Discharge: HOME | End: 2024-10-09
Payer: COMMERCIAL

## 2024-10-09 ENCOUNTER — APPOINTMENT (OUTPATIENT)
Dept: CT IMAGING | Age: 49
End: 2024-10-09
Payer: COMMERCIAL

## 2024-10-09 VITALS
RESPIRATION RATE: 16 BRPM | BODY MASS INDEX: 32.14 KG/M2 | DIASTOLIC BLOOD PRESSURE: 95 MMHG | HEIGHT: 66 IN | WEIGHT: 200 LBS | TEMPERATURE: 97.8 F | SYSTOLIC BLOOD PRESSURE: 143 MMHG | OXYGEN SATURATION: 97 % | HEART RATE: 76 BPM

## 2024-10-09 DIAGNOSIS — R11.0 NAUSEA: ICD-10-CM

## 2024-10-09 DIAGNOSIS — R10.11 RIGHT UPPER QUADRANT ABDOMINAL PAIN: Primary | ICD-10-CM

## 2024-10-09 LAB
ALBUMIN SERPL-MCNC: 3.8 G/DL (ref 3.5–4.6)
ALP SERPL-CCNC: 118 U/L (ref 40–130)
ALT SERPL-CCNC: 9 U/L (ref 0–33)
ANION GAP SERPL CALCULATED.3IONS-SCNC: 9 MEQ/L (ref 9–15)
AST SERPL-CCNC: 15 U/L (ref 0–35)
BASOPHILS # BLD: 0 K/UL (ref 0–0.2)
BASOPHILS NFR BLD: 0.4 %
BILIRUB SERPL-MCNC: <0.2 MG/DL (ref 0.2–0.7)
BILIRUB UR QL STRIP: NEGATIVE
BUN SERPL-MCNC: 10 MG/DL (ref 6–20)
CALCIUM SERPL-MCNC: 9.3 MG/DL (ref 8.5–9.9)
CHLORIDE SERPL-SCNC: 104 MEQ/L (ref 95–107)
CLARITY UR: CLEAR
CO2 SERPL-SCNC: 27 MEQ/L (ref 20–31)
COLOR UR: YELLOW
CREAT SERPL-MCNC: 0.68 MG/DL (ref 0.5–0.9)
EOSINOPHIL # BLD: 0.1 K/UL (ref 0–0.7)
EOSINOPHIL NFR BLD: 1.3 %
ERYTHROCYTE [DISTWIDTH] IN BLOOD BY AUTOMATED COUNT: 12.2 % (ref 11.5–14.5)
GLOBULIN SER CALC-MCNC: 3.6 G/DL (ref 2.3–3.5)
GLUCOSE SERPL-MCNC: 77 MG/DL (ref 70–99)
GLUCOSE UR STRIP-MCNC: NEGATIVE MG/DL
HCT VFR BLD AUTO: 42 % (ref 37–47)
HGB BLD-MCNC: 13.6 G/DL (ref 12–16)
HGB UR QL STRIP: NEGATIVE
KETONES UR STRIP-MCNC: NEGATIVE MG/DL
LEUKOCYTE ESTERASE UR QL STRIP: NEGATIVE
LIPASE SERPL-CCNC: 17 U/L (ref 12–95)
LYMPHOCYTES # BLD: 2.3 K/UL (ref 1–4.8)
LYMPHOCYTES NFR BLD: 26.6 %
MCH RBC QN AUTO: 26.7 PG (ref 27–31.3)
MCHC RBC AUTO-ENTMCNC: 32.4 % (ref 33–37)
MCV RBC AUTO: 82.5 FL (ref 79.4–94.8)
MONOCYTES # BLD: 0.5 K/UL (ref 0.2–0.8)
MONOCYTES NFR BLD: 6.4 %
NEUTROPHILS # BLD: 5.5 K/UL (ref 1.4–6.5)
NEUTS SEG NFR BLD: 65.1 %
NITRITE UR QL STRIP: NEGATIVE
PH UR STRIP: 6.5 [PH] (ref 5–9)
PLATELET # BLD AUTO: 361 K/UL (ref 130–400)
POC CREATININE WHOLE BLOOD: 0.8
POTASSIUM SERPL-SCNC: 4 MEQ/L (ref 3.4–4.9)
PROT SERPL-MCNC: 7.4 G/DL (ref 6.3–8)
PROT UR STRIP-MCNC: NEGATIVE MG/DL
RBC # BLD AUTO: 5.09 M/UL (ref 4.2–5.4)
SODIUM SERPL-SCNC: 140 MEQ/L (ref 135–144)
SP GR UR STRIP: 1.03 (ref 1–1.03)
URINE REFLEX TO CULTURE: NORMAL
UROBILINOGEN UR STRIP-ACNC: 1 E.U./DL
WBC # BLD AUTO: 8.5 K/UL (ref 4.8–10.8)

## 2024-10-09 PROCEDURE — 83690 ASSAY OF LIPASE: CPT

## 2024-10-09 PROCEDURE — 81003 URINALYSIS AUTO W/O SCOPE: CPT

## 2024-10-09 PROCEDURE — 96375 TX/PRO/DX INJ NEW DRUG ADDON: CPT

## 2024-10-09 PROCEDURE — 80053 COMPREHEN METABOLIC PANEL: CPT

## 2024-10-09 PROCEDURE — 6360000004 HC RX CONTRAST MEDICATION

## 2024-10-09 PROCEDURE — 85025 COMPLETE CBC W/AUTO DIFF WBC: CPT

## 2024-10-09 PROCEDURE — 74177 CT ABD & PELVIS W/CONTRAST: CPT

## 2024-10-09 PROCEDURE — 96374 THER/PROPH/DIAG INJ IV PUSH: CPT

## 2024-10-09 PROCEDURE — 6360000002 HC RX W HCPCS

## 2024-10-09 PROCEDURE — 99285 EMERGENCY DEPT VISIT HI MDM: CPT

## 2024-10-09 RX ORDER — DICYCLOMINE HYDROCHLORIDE 10 MG/1
10 CAPSULE ORAL
Qty: 60 CAPSULE | Refills: 0 | Status: SHIPPED | OUTPATIENT
Start: 2024-10-09

## 2024-10-09 RX ORDER — ONDANSETRON 4 MG/1
4 TABLET, ORALLY DISINTEGRATING ORAL 3 TIMES DAILY PRN
Qty: 21 TABLET | Refills: 0 | Status: SHIPPED | OUTPATIENT
Start: 2024-10-09

## 2024-10-09 RX ORDER — ONDANSETRON 2 MG/ML
4 INJECTION INTRAMUSCULAR; INTRAVENOUS ONCE
Status: COMPLETED | OUTPATIENT
Start: 2024-10-09 | End: 2024-10-09

## 2024-10-09 RX ORDER — FENTANYL CITRATE 0.05 MG/ML
50 INJECTION, SOLUTION INTRAMUSCULAR; INTRAVENOUS ONCE
Status: COMPLETED | OUTPATIENT
Start: 2024-10-09 | End: 2024-10-09

## 2024-10-09 RX ORDER — IOPAMIDOL 755 MG/ML
75 INJECTION, SOLUTION INTRAVASCULAR
Status: COMPLETED | OUTPATIENT
Start: 2024-10-09 | End: 2024-10-09

## 2024-10-09 RX ADMIN — IOPAMIDOL 75 ML: 755 INJECTION, SOLUTION INTRAVENOUS at 13:48

## 2024-10-09 RX ADMIN — FENTANYL CITRATE 50 MCG: 0.05 INJECTION, SOLUTION INTRAMUSCULAR; INTRAVENOUS at 13:54

## 2024-10-09 RX ADMIN — ONDANSETRON 4 MG: 2 INJECTION, SOLUTION INTRAMUSCULAR; INTRAVENOUS at 13:54

## 2024-10-09 ASSESSMENT — ENCOUNTER SYMPTOMS
NAUSEA: 1
ABDOMINAL PAIN: 1

## 2024-10-09 ASSESSMENT — PAIN SCALES - GENERAL: PAINLEVEL_OUTOF10: 10

## 2024-10-09 ASSESSMENT — PAIN DESCRIPTION - ORIENTATION: ORIENTATION: RIGHT;LOWER

## 2024-10-09 ASSESSMENT — PAIN - FUNCTIONAL ASSESSMENT
PAIN_FUNCTIONAL_ASSESSMENT: NONE - DENIES PAIN
PAIN_FUNCTIONAL_ASSESSMENT: 0-10

## 2024-10-09 ASSESSMENT — PAIN DESCRIPTION - LOCATION: LOCATION: ABDOMEN

## 2024-10-09 NOTE — ED PROVIDER NOTES
discussed.       (Please note that portions of this note were completed with a voice recognition program.  Efforts were made to edit the dictations but occasionally words are mis-transcribed.)    JULIANO Arriaga (electronically signed)    Supervising Attending Physician: Dr. Mak.     Caity Goldberg, MASON-C  10/09/24 1055

## 2024-10-09 NOTE — ED TRIAGE NOTES
Pt presents to ER via self with family from home.  Conscious, Aox4.  C/o: Right lower abdominal pain, states it radiates to the back and legs.  Nauseous.  Pt states she's being seen currently for gallbladder issues.  States pain 10/10

## 2024-10-09 NOTE — DISCHARGE INSTRUCTIONS
Take medications as directed.     Follow-up with PCP, general surgery.     Return to ED if any new, or worsening symptoms.

## 2024-10-10 LAB
PERFORMED ON: NORMAL
POC CREATININE: 0.8 MG/DL (ref 0.6–1.2)
POC SAMPLE TYPE: NORMAL

## 2024-10-14 ENCOUNTER — OFFICE VISIT (OUTPATIENT)
Dept: SURGERY | Age: 49
End: 2024-10-14
Payer: COMMERCIAL

## 2024-10-14 VITALS
HEIGHT: 66 IN | SYSTOLIC BLOOD PRESSURE: 130 MMHG | WEIGHT: 208 LBS | DIASTOLIC BLOOD PRESSURE: 80 MMHG | BODY MASS INDEX: 33.43 KG/M2 | HEART RATE: 78 BPM | OXYGEN SATURATION: 95 % | TEMPERATURE: 98 F

## 2024-10-14 DIAGNOSIS — K76.0 HEPATIC STEATOSIS: Primary | ICD-10-CM

## 2024-10-14 PROCEDURE — G8484 FLU IMMUNIZE NO ADMIN: HCPCS | Performed by: SURGERY

## 2024-10-14 PROCEDURE — 1036F TOBACCO NON-USER: CPT | Performed by: SURGERY

## 2024-10-14 PROCEDURE — G8419 CALC BMI OUT NRM PARAM NOF/U: HCPCS | Performed by: SURGERY

## 2024-10-14 PROCEDURE — 99203 OFFICE O/P NEW LOW 30 MIN: CPT | Performed by: SURGERY

## 2024-10-14 PROCEDURE — G8427 DOCREV CUR MEDS BY ELIG CLIN: HCPCS | Performed by: SURGERY

## 2024-10-14 NOTE — PROGRESS NOTES
\"PTT\", \"WBCU\", \"RBCU\", \"LEUKOCYTESUA\"    RADIOLOGY:   I have personally reviewed the following films:    CT ABDOMEN PELVIS W IV CONTRAST Additional Contrast? None    Result Date: 10/9/2024  EXAMINATION: CT OF THE ABDOMEN AND PELVIS WITH CONTRAST 10/9/2024 1:46 pm TECHNIQUE: CT of the abdomen and pelvis was performed with the administration of intravenous contrast. Multiplanar reformatted images are provided for review. Automated exposure control, iterative reconstruction, and/or weight based adjustment of the mA/kV was utilized to reduce the radiation dose to as low as reasonably achievable. COMPARISON: November 2022 HISTORY: ORDERING SYSTEM PROVIDED HISTORY: RUQ abd pain TECHNOLOGIST PROVIDED HISTORY: Additional Contrast?->None Reason for exam:->RUQ abd pain Decision Support Exception - unselect if not a suspected or confirmed emergency medical condition->Emergency Medical Condition (MA) What reading provider will be dictating this exam?->CRC FINDINGS: Lower Chest:  Visualized portion of the lower chest demonstrates no acute abnormality. Organs: Mild hepatic steatosis.  Small cyst in the left lobe.  Contracted gallbladder.  The pancreas demonstrates no significant abnormality.  The spleen is homogeneous.  The adrenal glands are normal.  Large exophytic inferior pole 6.6 cm cystic lesion with thin septations GI/Bowel:  There is no evidence of bowel obstruction.  No evidence of abnormal bowel wall thickening or distension.  Tiny hiatal hernia.  Normal appendix Pelvis: No pelvic mass, adenopathy, or fluid collection. Peritoneum/Retroperitoneum: There is no lymphadenopathy.  Portal venous, arterial and venous structures are unremarkable. There is no ascites. Bones/Soft Tissues: Tiny fat containing umbilical hernia.  Mild degenerative changes in the lower lumbar spine.     1. No acute intra-abdominal or pelvic pathology. 2. Mild hepatic steatosis. 3. Large exophytic inferior pole 6.6 cm cystic lesion with thin

## 2024-10-21 ENCOUNTER — HOSPITAL ENCOUNTER (OUTPATIENT)
Dept: ULTRASOUND IMAGING | Age: 49
Discharge: HOME | End: 2024-10-23
Attending: SURGERY
Payer: COMMERCIAL

## 2024-10-21 DIAGNOSIS — K76.0 HEPATIC STEATOSIS: ICD-10-CM

## 2024-10-21 PROCEDURE — 76705 ECHO EXAM OF ABDOMEN: CPT

## 2024-11-05 ENCOUNTER — OFFICE VISIT (OUTPATIENT)
Dept: GASTROENTEROLOGY | Age: 49
End: 2024-11-05
Payer: COMMERCIAL

## 2024-11-05 VITALS
DIASTOLIC BLOOD PRESSURE: 86 MMHG | OXYGEN SATURATION: 96 % | WEIGHT: 206 LBS | SYSTOLIC BLOOD PRESSURE: 136 MMHG | BODY MASS INDEX: 33.25 KG/M2 | HEART RATE: 77 BPM

## 2024-11-05 DIAGNOSIS — N28.1 RENAL CYST: Primary | ICD-10-CM

## 2024-11-05 PROCEDURE — G8427 DOCREV CUR MEDS BY ELIG CLIN: HCPCS | Performed by: INTERNAL MEDICINE

## 2024-11-05 PROCEDURE — G8417 CALC BMI ABV UP PARAM F/U: HCPCS | Performed by: INTERNAL MEDICINE

## 2024-11-05 PROCEDURE — G8484 FLU IMMUNIZE NO ADMIN: HCPCS | Performed by: INTERNAL MEDICINE

## 2024-11-05 PROCEDURE — 99203 OFFICE O/P NEW LOW 30 MIN: CPT | Performed by: INTERNAL MEDICINE

## 2024-11-05 PROCEDURE — 1036F TOBACCO NON-USER: CPT | Performed by: INTERNAL MEDICINE

## 2024-11-05 RX ORDER — FAMOTIDINE 20 MG/1
20 TABLET, FILM COATED ORAL 2 TIMES DAILY
COMMUNITY
Start: 2024-07-12 | End: 2025-01-08

## 2024-11-05 ASSESSMENT — ENCOUNTER SYMPTOMS
CONSTIPATION: 0
EYE REDNESS: 0
ABDOMINAL PAIN: 0
VOICE CHANGE: 0
DIARRHEA: 0
CHEST TIGHTNESS: 0
SHORTNESS OF BREATH: 0
NAUSEA: 0
ABDOMINAL DISTENTION: 0
EYE PAIN: 0
PHOTOPHOBIA: 0
RECTAL PAIN: 0
COLOR CHANGE: 0
VOMITING: 0
BLOOD IN STOOL: 0
WHEEZING: 0
TROUBLE SWALLOWING: 0

## 2024-11-05 NOTE — PROGRESS NOTES
small liver cyst around 8 to 9 mm that was also noted on previous MRI from March 2024 at [place]  Noted also complex renal cyst for which patient was seen at [facility] however patient would like to be referred locally for urology evaluation at [facility]  3-Underlying liver disease evaluation   No clinical or lab data suggestive of advanced liver disease   Patient declined fibroscan for fibrosis staging and assessment  4- Right upper quadrant pain  Patient was seen by the surgical team for gallbladder evaluation.  She also scheduled for colonoscopy at [facility].  Patient also had a recent EGD at [facility].  Patient was recommended to continue follow-up with primary GI at [facility] to avoid fragmented care.  She was recommended at some point HIDA scan  5- Associated medical conditions: Include but not limited to history of bipolar disorder, depression, fibromyalgia, prior exposure to hepatitis C, history of substance abuse but none at this time, hysterectomy...            Return in about 6 months (around 5/5/2025) for further management.      Angela Cagle MD

## 2024-12-27 ENCOUNTER — APPOINTMENT (OUTPATIENT)
Dept: GENERAL RADIOLOGY | Age: 49
End: 2024-12-27
Payer: COMMERCIAL

## 2024-12-27 ENCOUNTER — HOSPITAL ENCOUNTER (EMERGENCY)
Age: 49
Discharge: HOME | End: 2024-12-27
Payer: COMMERCIAL

## 2024-12-27 VITALS
HEART RATE: 88 BPM | RESPIRATION RATE: 16 BRPM | TEMPERATURE: 98.2 F | WEIGHT: 210 LBS | HEIGHT: 65 IN | DIASTOLIC BLOOD PRESSURE: 79 MMHG | BODY MASS INDEX: 34.99 KG/M2 | OXYGEN SATURATION: 99 % | SYSTOLIC BLOOD PRESSURE: 109 MMHG

## 2024-12-27 DIAGNOSIS — R42 DIZZINESS: Primary | ICD-10-CM

## 2024-12-27 DIAGNOSIS — F41.1 ANXIETY STATE: ICD-10-CM

## 2024-12-27 DIAGNOSIS — R00.2 PALPITATIONS: ICD-10-CM

## 2024-12-27 LAB
ALBUMIN SERPL-MCNC: 3.7 G/DL (ref 3.5–4.6)
ALP SERPL-CCNC: 114 U/L (ref 40–130)
ALT SERPL-CCNC: 9 U/L (ref 0–33)
AMPHET UR QL SCN: NORMAL
ANION GAP SERPL CALCULATED.3IONS-SCNC: 12 MEQ/L (ref 9–15)
AST SERPL-CCNC: 12 U/L (ref 0–35)
BARBITURATES UR QL SCN: NORMAL
BASOPHILS # BLD: 0 K/UL (ref 0–0.2)
BASOPHILS NFR BLD: 0.3 %
BENZODIAZ UR QL SCN: NORMAL
BILIRUB SERPL-MCNC: 0.3 MG/DL (ref 0.2–0.7)
BILIRUB UR QL STRIP: NEGATIVE
BUN SERPL-MCNC: 9 MG/DL (ref 6–20)
CALCIUM SERPL-MCNC: 8.8 MG/DL (ref 8.5–9.9)
CANNABINOIDS UR QL SCN: NORMAL
CHLORIDE SERPL-SCNC: 102 MEQ/L (ref 95–107)
CLARITY UR: CLEAR
CO2 SERPL-SCNC: 26 MEQ/L (ref 20–31)
COCAINE UR QL SCN: NORMAL
COLOR UR: YELLOW
CREAT SERPL-MCNC: 0.81 MG/DL (ref 0.5–0.9)
DRUG SCREEN COMMENT UR-IMP: NORMAL
EKG ATRIAL RATE: 0 BPM
EKG Q-T INTERVAL: 0 MS
EKG QRS DURATION: 0 MS
EKG QTC CALCULATION (BAZETT): 0 MS
EKG R AXIS: 0 DEGREES
EKG T AXIS: 0 DEGREES
EKG VENTRICULAR RATE: 0 BPM
EOSINOPHIL # BLD: 0.2 K/UL (ref 0–0.7)
EOSINOPHIL NFR BLD: 1.7 %
ERYTHROCYTE [DISTWIDTH] IN BLOOD BY AUTOMATED COUNT: 12.8 % (ref 11.5–14.5)
FENTANYL SCREEN, URINE: NORMAL
GLOBULIN SER CALC-MCNC: 3.4 G/DL (ref 2.3–3.5)
GLUCOSE SERPL-MCNC: 136 MG/DL (ref 70–99)
GLUCOSE UR STRIP-MCNC: NEGATIVE MG/DL
HCT VFR BLD AUTO: 40.7 % (ref 37–47)
HGB BLD-MCNC: 13.3 G/DL (ref 12–16)
HGB UR QL STRIP: NEGATIVE
KETONES UR STRIP-MCNC: NEGATIVE MG/DL
LEUKOCYTE ESTERASE UR QL STRIP: NEGATIVE
LYMPHOCYTES # BLD: 1.8 K/UL (ref 1–4.8)
LYMPHOCYTES NFR BLD: 17.8 %
MAGNESIUM SERPL-MCNC: 2.1 MG/DL (ref 1.7–2.4)
MCH RBC QN AUTO: 27 PG (ref 27–31.3)
MCHC RBC AUTO-ENTMCNC: 32.7 % (ref 33–37)
MCV RBC AUTO: 82.7 FL (ref 79.4–94.8)
METHADONE UR QL SCN: NORMAL
MONOCYTES # BLD: 0.5 K/UL (ref 0.2–0.8)
MONOCYTES NFR BLD: 4.9 %
NEUTROPHILS # BLD: 7.5 K/UL (ref 1.4–6.5)
NEUTS SEG NFR BLD: 74.7 %
NITRITE UR QL STRIP: NEGATIVE
OPIATES UR QL SCN: NORMAL
OXYCODONE UR QL SCN: NORMAL
PCP UR QL SCN: NORMAL
PH UR STRIP: 5.5 [PH] (ref 5–9)
PLATELET # BLD AUTO: 346 K/UL (ref 130–400)
POTASSIUM SERPL-SCNC: 4 MEQ/L (ref 3.4–4.9)
PROPOXYPH UR QL SCN: NORMAL
PROT SERPL-MCNC: 7.1 G/DL (ref 6.3–8)
PROT UR STRIP-MCNC: NEGATIVE MG/DL
RBC # BLD AUTO: 4.92 M/UL (ref 4.2–5.4)
SODIUM SERPL-SCNC: 140 MEQ/L (ref 135–144)
SP GR UR STRIP: 1.02 (ref 1–1.03)
TSH SERPL-MCNC: 0.9 UIU/ML (ref 0.44–3.86)
URINE REFLEX TO CULTURE: NORMAL
UROBILINOGEN UR STRIP-ACNC: 1 E.U./DL
WBC # BLD AUTO: 10 K/UL (ref 4.8–10.8)

## 2024-12-27 PROCEDURE — 81003 URINALYSIS AUTO W/O SCOPE: CPT

## 2024-12-27 PROCEDURE — 84443 ASSAY THYROID STIM HORMONE: CPT

## 2024-12-27 PROCEDURE — 80307 DRUG TEST PRSMV CHEM ANLYZR: CPT

## 2024-12-27 PROCEDURE — 83735 ASSAY OF MAGNESIUM: CPT

## 2024-12-27 PROCEDURE — 85025 COMPLETE CBC W/AUTO DIFF WBC: CPT

## 2024-12-27 PROCEDURE — 80053 COMPREHEN METABOLIC PANEL: CPT

## 2024-12-27 PROCEDURE — 71045 X-RAY EXAM CHEST 1 VIEW: CPT

## 2024-12-27 PROCEDURE — 99285 EMERGENCY DEPT VISIT HI MDM: CPT

## 2024-12-27 ASSESSMENT — ENCOUNTER SYMPTOMS
SHORTNESS OF BREATH: 0
ABDOMINAL PAIN: 0
ABDOMINAL DISTENTION: 0
CONSTIPATION: 0
RHINORRHEA: 0
EYE DISCHARGE: 0
COLOR CHANGE: 0
SORE THROAT: 0

## 2024-12-27 NOTE — ED NOTES
Discharge instructions reviewed with patient. Patient denies any further questions at this time. Pt encouraged to make follow up appointments with PCP and any speciality referrals.

## 2024-12-27 NOTE — ED TRIAGE NOTES
Pt stated that she feel like she had an irregular heartbeat. Pt stated that this has been ongoing for the last 2 months. Pt stated that she saw her dr and that they referred her to cardiology, but the appointment isnt for awhile. Pt stated that she gets dizzy when shes moving

## 2024-12-27 NOTE — ED NOTES
Patient complaints of palpitations and lightheadedness. Patient states this started after being on a weight loss pill. Patient is alert and oriented. Patient states her PCP put her on omeprazole for the pain. Patient states trying to get in with a cardiologist but cannot get an appt until March

## 2024-12-27 NOTE — ED PROVIDER NOTES
[facility] ED  EMERGENCY DEPARTMENT ENCOUNTER      Pt Name: Radha Jhaveri  MRN: [id number]  Birthdate 1975  Date of evaluation: 12/27/2024  Provider: Todd Forrest PA-C  2:11 PM EST    CHIEF COMPLAINT       Chief Complaint   Patient presents with    Hypertension    Dizziness         HISTORY OF PRESENT ILLNESS   (Location/Symptom, Timing/Onset, Context/Setting, Quality, Duration, Modifying Factors, Severity)  Note limiting factors.   Radha Jhaveri is a 49 y.o. female who presents to the emergency department with a complaint of intermittent periods of palpitations, which patient states is been ongoing for approximate last 2 months, she does see rheumatologist, she complained to her rheumatologist about the issue, she states she does have dizziness with onset of palpitations, rheumatologist had set her up with cardiology for follow-up, she states this appointment is not until the end of January.  She denies any chest pain shortness of breath, no nausea vomiting.  Patient states that when the palpitations occur, her blood pressure goes up.  She has no previous history of hypertension.  States episodes of palpitations a day, on arrival to the ED blood pressure 163/102, as she calm down, symptoms resolved she is not having palpitations at time my evaluation, blood pressure returned to normal.  Patient states previous history of anxiety, she states she has been out of her anxiety medications for over 2 months.  Denies any new or acute stressors.  Past medical history significant for asthma, rheumatoid arthritis, depression, hepatitis C, anxiety, polysubstance abuse    HPI    Nursing Notes were reviewed.    REVIEW OF SYSTEMS    (2-9 systems for level 4, 10 or more for level 5)     Review of Systems   Constitutional:  Negative for activity change and appetite change.   HENT:  Negative for congestion, ear discharge, ear pain, nosebleeds, rhinorrhea and sore throat.    Eyes:  Negative for discharge.  primary provider, rheumatologist, without specific or acute findings, she was referred to cardiology states she does not have an appointment until the end of January, or the beginning of March.  She states that her symptoms began again today, she was felt like her heart was racing, she states she took her blood pressure was high, therefore she came to the ED she had no chest pain or shortness of breath, on arrival to the ED she is hypertensive and tachycardic initial arrival, appears mildly anxious, she does have previous history of anxiety states she has been off her anxiety medications for approximate last 2 months.  After patient was observed in the ED, heart rate did decline on its own, as well as blood pressure, at time of discharge patient's blood pressure is 109/79, heart rate of 95 bpm patient is very comfortable, she states that whenever she comes to the ER for these issues the reason usually resolved without acute findings.  I believe that her issues may be related to her anxiety as when she becomes more comfortable, heart rate, and blood pressure do resolve on their own.  She was advised to keep her scheduled point with cardiology for potential Holter monitor placement, she was also given cardiology referral here at [facility], she states that she does not want a wait until her follow-up with cardiologist at the [facility].  Patient advised if there is any worsening changes symptoms, return to the ED for reevaluation.    Amount and/or Complexity of Data Reviewed  Labs: ordered.  Radiology: ordered.            REASSESSMENT          CRITICAL CARE TIME       CONSULTS:  None    PROCEDURES:  Unless otherwise noted below, none     Procedures      FINAL IMPRESSION      1. Dizziness    2. Anxiety state    3. Palpitations          DISPOSITION/PLAN   DISPOSITION Decision To Discharge 12/27/2024 03:14:44 PM      PATIENT REFERRED TO:  Carrie Echavarria MD  [address] [place] [address]  [phone]    In 3

## 2024-12-31 ENCOUNTER — OFFICE VISIT (OUTPATIENT)
Dept: CARDIOLOGY CLINIC | Age: 49
End: 2024-12-31
Payer: COMMERCIAL

## 2024-12-31 VITALS
HEART RATE: 86 BPM | DIASTOLIC BLOOD PRESSURE: 72 MMHG | OXYGEN SATURATION: 98 % | BODY MASS INDEX: 35.45 KG/M2 | SYSTOLIC BLOOD PRESSURE: 118 MMHG | WEIGHT: 213 LBS

## 2024-12-31 DIAGNOSIS — M05.79 RHEUMATOID ARTHRITIS OF MULTIPLE SITES WITHOUT ORGAN OR SYSTEM INVOLVEMENT WITH POSITIVE RHEUMATOID FACTOR (HCC): ICD-10-CM

## 2024-12-31 DIAGNOSIS — R06.09 DOE (DYSPNEA ON EXERTION): Primary | ICD-10-CM

## 2024-12-31 DIAGNOSIS — R42 DIZZINESS: ICD-10-CM

## 2024-12-31 DIAGNOSIS — R07.9 CHEST PAIN, UNSPECIFIED TYPE: ICD-10-CM

## 2024-12-31 DIAGNOSIS — R00.2 PALPITATIONS: ICD-10-CM

## 2024-12-31 LAB
EKG ATRIAL RATE: 88 BPM
EKG P AXIS: 14 DEGREES
EKG P-R INTERVAL: 128 MS
EKG Q-T INTERVAL: 376 MS
EKG QRS DURATION: 90 MS
EKG QTC CALCULATION (BAZETT): 454 MS
EKG R AXIS: 14 DEGREES
EKG T AXIS: 30 DEGREES
EKG VENTRICULAR RATE: 88 BPM

## 2024-12-31 PROCEDURE — G8417 CALC BMI ABV UP PARAM F/U: HCPCS | Performed by: INTERNAL MEDICINE

## 2024-12-31 PROCEDURE — G8427 DOCREV CUR MEDS BY ELIG CLIN: HCPCS | Performed by: INTERNAL MEDICINE

## 2024-12-31 PROCEDURE — G8484 FLU IMMUNIZE NO ADMIN: HCPCS | Performed by: INTERNAL MEDICINE

## 2024-12-31 PROCEDURE — 1036F TOBACCO NON-USER: CPT | Performed by: INTERNAL MEDICINE

## 2024-12-31 PROCEDURE — 99204 OFFICE O/P NEW MOD 45 MIN: CPT | Performed by: INTERNAL MEDICINE

## 2024-12-31 RX ORDER — AZELASTINE 1 MG/ML
1 SPRAY, METERED NASAL 2 TIMES DAILY
COMMUNITY
Start: 2024-06-13

## 2024-12-31 RX ORDER — FLUTICASONE PROPIONATE 50 MCG
1 SPRAY, SUSPENSION (ML) NASAL 2 TIMES DAILY
COMMUNITY
Start: 2024-06-13

## 2024-12-31 ASSESSMENT — ENCOUNTER SYMPTOMS
EYES NEGATIVE: 1
BLOOD IN STOOL: 0
CHEST TIGHTNESS: 0
WHEEZING: 0
STRIDOR: 0
COUGH: 0
SHORTNESS OF BREATH: 1
GASTROINTESTINAL NEGATIVE: 1
NAUSEA: 0

## 2024-12-31 NOTE — PROGRESS NOTES
Rheumatoid arthritis of multiple sites without organ or system involvement with positive rheumatoid factor (HCC)    Swelling of knee joint    Synovitis of knee    Upper respiratory infection with cough and congestion    Urticaria    Vitamin D deficiency    Status migrainosus    Generalized anxiety disorder with panic attacks    Opioid use disorder, moderate, in sustained remission (HCC)    Chronic daily headache    Bipolar affective disorder, currently depressed, moderate (HCC)    Bilateral low back pain without sciatica    Migraine with aura and without status migrainosus, not intractable    Chronic bilateral low back pain with bilateral sciatica    Cervical radiculopathy    Small fiber neuropathy    Distal paresthesia    Bipolar disorder with depression (HCC)    DDD (degenerative disc disease), lumbar    Gastroesophageal reflux disease without esophagitis    Hepatic steatosis    Lumbosacral spondylosis without myelopathy    Malignant tumor of cervix (HCC)    Mild intermittent asthma without complication    Muscle pain    Polysubstance abuse (HCC)    Pulsatile tinnitus, right ear    Wears dentures       There are no discontinued medications.    Modified Medications    No medications on file       No orders of the defined types were placed in this encounter.      Assessment/Plan:    1. HERNANDEZ (dyspnea on exertion)       2. Chest pain, unspecified type     - Echo (TTE) complete (PRN contrast/bubble/strain/3D); Future  - Nuclear stress test with myocardial perfusion; Future    3. Dizziness       4. Palpitations     - Extended cardiac holter monitor (3 days-14 day); Future    5. Rheumatoid arthritis of multiple sites without organ or system involvement with positive rheumatoid factor (HCC)          Counseling:  Heart Healthy Lifestyle, Improve BMI, Low Salt Diet, Take Precautions to Prevent Falls, and Walk Daily    Return for AFTER TESTS.      Electronically signed by CHANDLER SAINZ MD on 12/31/2024 at 8:56 AM

## 2025-01-14 DIAGNOSIS — R00.2 PALPITATIONS: ICD-10-CM

## 2025-03-05 ENCOUNTER — TELEPHONE (OUTPATIENT)
Dept: CARDIOLOGY CLINIC | Age: 50
End: 2025-03-05

## 2025-03-05 DIAGNOSIS — R07.9 CHEST PAIN, UNSPECIFIED TYPE: Primary | ICD-10-CM

## 2025-03-05 NOTE — TELEPHONE ENCOUNTER
Radha needs new stress and echo test order since last one was denied.   She did get an approval letter.

## 2025-03-06 NOTE — TELEPHONE ENCOUNTER
Stress and echo orders do not  until 2025.     Called and spoke to patient to notify her that the orders that are still in able to be used. Patient states that scheduling told her she was unable to schedule the testing due to the exams being denied in the past. New orders placed. Patient provided with phone number to pre access to schedule tests.

## 2025-03-14 ENCOUNTER — TELEPHONE (OUTPATIENT)
Dept: CARDIOLOGY CLINIC | Age: 50
End: 2025-03-14

## 2025-03-14 DIAGNOSIS — R07.2 PRECORDIAL PAIN: Primary | ICD-10-CM

## 2025-03-14 NOTE — TELEPHONE ENCOUNTER
PATIENT'S INSURANCE CALLED STATES THE STRESS TEST HAS BEEN DENIED BY INS.    NEEDS PEER TO PEER    CASE # [id number]    [phone]    PLEASE ADVISE

## 2025-03-18 ENCOUNTER — TELEPHONE (OUTPATIENT)
Dept: CARDIOLOGY CLINIC | Age: 50
End: 2025-03-18

## 2025-03-18 NOTE — TELEPHONE ENCOUNTER
Per Radha-   Please make sure central scheduling know this needs re-authorized due to timing of appointment being schedule. This is for Central Scheduling.

## 2025-03-18 NOTE — TELEPHONE ENCOUNTER
Pt came into the office for us to make a copy of her her Transthoracic Echo approval letter to send to Radha. Pt states that she spoke to Radha and was told to bring in the approval letter.  Approval letter was scanned to Radha and also scanned into pt chart.      Transthoracic Echo approved from 1/7/2025 to 3/8/2025.   Authorization # [id number]    APPROVAL SCANNED INTO CHART.

## 2025-03-18 NOTE — TELEPHONE ENCOUNTER
Faxed to [facility] scheduling dept, waiting for fax confirmation.

## 2025-03-18 NOTE — TELEPHONE ENCOUNTER
[facility]-  Transthoracic Echo approved from 1/7/2025 to 3/8/2025.   Authorization # [id number]    APPROVAL SCANNED INTO CHART.

## 2025-04-01 ENCOUNTER — HOSPITAL ENCOUNTER (OUTPATIENT)
Age: 50
Discharge: HOME | End: 2025-04-03
Attending: INTERNAL MEDICINE
Payer: MEDICAID

## 2025-04-01 ENCOUNTER — HOSPITAL ENCOUNTER (OUTPATIENT)
Dept: NUCLEAR MEDICINE | Age: 50
Discharge: HOME | End: 2025-04-03
Attending: INTERNAL MEDICINE
Payer: MEDICAID

## 2025-04-01 VITALS
HEIGHT: 65 IN | WEIGHT: 213 LBS | SYSTOLIC BLOOD PRESSURE: 118 MMHG | BODY MASS INDEX: 35.49 KG/M2 | DIASTOLIC BLOOD PRESSURE: 72 MMHG

## 2025-04-01 DIAGNOSIS — R07.9 CHEST PAIN, UNSPECIFIED TYPE: ICD-10-CM

## 2025-04-01 LAB
ECHO AO ROOT DIAM: 3 CM
ECHO AO ROOT INDEX: 1.48 CM/M2
ECHO AV AREA PEAK VELOCITY: 2.4 CM2
ECHO AV AREA VTI: 2.4 CM2
ECHO AV AREA/BSA PEAK VELOCITY: 1.2 CM2/M2
ECHO AV AREA/BSA VTI: 1.2 CM2/M2
ECHO AV MEAN GRADIENT: 3 MMHG
ECHO AV MEAN VELOCITY: 0.8 M/S
ECHO AV PEAK GRADIENT: 6 MMHG
ECHO AV PEAK VELOCITY: 1.3 M/S
ECHO AV VELOCITY RATIO: 0.69
ECHO AV VTI: 27.5 CM
ECHO BSA: 2.1 M2
ECHO EST RA PRESSURE: 3 MMHG
ECHO LA DIAMETER INDEX: 1.67 CM/M2
ECHO LA DIAMETER: 3.4 CM
ECHO LA TO AORTIC ROOT RATIO: 1.13
ECHO LA VOL A-L A2C: 43 ML (ref 22–52)
ECHO LA VOL A-L A4C: 40 ML (ref 22–52)
ECHO LA VOL MOD A2C: 41 ML (ref 22–52)
ECHO LA VOL MOD A4C: 38 ML (ref 22–52)
ECHO LA VOLUME AREA LENGTH: 42 ML
ECHO LA VOLUME INDEX A-L A2C: 21 ML/M2 (ref 16–34)
ECHO LA VOLUME INDEX A-L A4C: 20 ML/M2 (ref 16–34)
ECHO LA VOLUME INDEX AREA LENGTH: 21 ML/M2 (ref 16–34)
ECHO LA VOLUME INDEX MOD A2C: 20 ML/M2 (ref 16–34)
ECHO LA VOLUME INDEX MOD A4C: 19 ML/M2 (ref 16–34)
ECHO LV E' LATERAL VELOCITY: 12.31 CM/S
ECHO LV E' SEPTAL VELOCITY: 11.78 CM/S
ECHO LV EDV A2C: 117 ML
ECHO LV EDV A4C: 75 ML
ECHO LV EDV BP: 95 ML (ref 56–104)
ECHO LV EDV INDEX A4C: 37 ML/M2
ECHO LV EDV INDEX BP: 47 ML/M2
ECHO LV EDV NDEX A2C: 58 ML/M2
ECHO LV EF PHYSICIAN: 65 %
ECHO LV EJECTION FRACTION A2C: 58 %
ECHO LV EJECTION FRACTION A4C: 69 %
ECHO LV EJECTION FRACTION BIPLANE: 63 % (ref 55–100)
ECHO LV ESV A2C: 49 ML
ECHO LV ESV A4C: 23 ML
ECHO LV ESV BP: 35 ML (ref 19–49)
ECHO LV ESV INDEX A2C: 24 ML/M2
ECHO LV ESV INDEX A4C: 11 ML/M2
ECHO LV ESV INDEX BP: 17 ML/M2
ECHO LV FRACTIONAL SHORTENING: 28 % (ref 28–44)
ECHO LV INTERNAL DIMENSION DIASTOLE INDEX: 2.46 CM/M2
ECHO LV INTERNAL DIMENSION DIASTOLIC: 5 CM (ref 3.9–5.3)
ECHO LV INTERNAL DIMENSION SYSTOLIC INDEX: 1.77 CM/M2
ECHO LV INTERNAL DIMENSION SYSTOLIC: 3.6 CM
ECHO LV IVSD: 0.8 CM (ref 0.6–0.9)
ECHO LV IVSS: 1 CM
ECHO LV MASS 2D: 220.3 G (ref 67–162)
ECHO LV MASS INDEX 2D: 108.5 G/M2 (ref 43–95)
ECHO LV POSTERIOR WALL DIASTOLIC: 1.5 CM (ref 0.6–0.9)
ECHO LV POSTERIOR WALL SYSTOLIC: 2.1 CM
ECHO LV RELATIVE WALL THICKNESS RATIO: 0.6
ECHO LVOT AREA: 3.1 CM2
ECHO LVOT AV VTI INDEX: 0.72
ECHO LVOT DIAM: 2 CM
ECHO LVOT MEAN GRADIENT: 1 MMHG
ECHO LVOT PEAK GRADIENT: 3 MMHG
ECHO LVOT PEAK VELOCITY: 0.9 M/S
ECHO LVOT STROKE VOLUME INDEX: 30.6 ML/M2
ECHO LVOT SV: 62.2 ML
ECHO LVOT VTI: 19.8 CM
ECHO MV A VELOCITY: 0.57 M/S
ECHO MV E DECELERATION TIME (DT): 169.4 MS
ECHO MV E VELOCITY: 0.82 M/S
ECHO MV E/A RATIO: 1.44
ECHO MV E/E' LATERAL: 6.66
ECHO MV E/E' RATIO (AVERAGED): 6.81
ECHO MV E/E' SEPTAL: 6.96
ECHO PV MAX VELOCITY: 0.9 M/S
ECHO PV PEAK GRADIENT: 3 MMHG
ECHO RIGHT VENTRICULAR SYSTOLIC PRESSURE (RVSP): 18 MMHG
ECHO RV INTERNAL DIMENSION: 2.7 CM
ECHO RV TAPSE: 1.8 CM (ref 1.7–?)
ECHO TV REGURGITANT MAX VELOCITY: 1.92 M/S
ECHO TV REGURGITANT PEAK GRADIENT: 15 MMHG
NUC STRESS EJECTION FRACTION: 60 %
STRESS BASELINE DIAS BP: 84 MMHG
STRESS BASELINE HR: 69 BPM
STRESS BASELINE ST DEPRESSION: 0 MM
STRESS BASELINE SYS BP: 143 MMHG
STRESS ESTIMATED WORKLOAD: 1 METS
STRESS PEAK DIAS BP: 86 MMHG
STRESS PEAK SYS BP: 159 MMHG
STRESS PERCENT HR ACHIEVED: 74 %
STRESS POST PEAK HR: 126 BPM
STRESS RATE PRESSURE PRODUCT: NORMAL BPM*MMHG
STRESS ST DEPRESSION: 0 MM
STRESS TARGET HR: 171 BPM
TID: 0.9

## 2025-04-01 PROCEDURE — 2500000003 HC RX 250 WO HCPCS: Performed by: INTERNAL MEDICINE

## 2025-04-01 PROCEDURE — 93017 CV STRESS TEST TRACING ONLY: CPT

## 2025-04-01 PROCEDURE — 6360000002 HC RX W HCPCS: Performed by: INTERNAL MEDICINE

## 2025-04-01 PROCEDURE — 3430000000 HC RX DIAGNOSTIC RADIOPHARMACEUTICAL: Performed by: INTERNAL MEDICINE

## 2025-04-01 PROCEDURE — A9502 TC99M TETROFOSMIN: HCPCS | Performed by: INTERNAL MEDICINE

## 2025-04-01 PROCEDURE — 93306 TTE W/DOPPLER COMPLETE: CPT

## 2025-04-01 PROCEDURE — 78452 HT MUSCLE IMAGE SPECT MULT: CPT

## 2025-04-01 RX ORDER — SODIUM CHLORIDE 0.9 % (FLUSH) 0.9 %
10 SYRINGE (ML) INJECTION PRN
Status: COMPLETED | OUTPATIENT
Start: 2025-04-01 | End: 2025-04-01

## 2025-04-01 RX ORDER — REGADENOSON 0.08 MG/ML
0.4 INJECTION, SOLUTION INTRAVENOUS
Status: COMPLETED | OUTPATIENT
Start: 2025-04-01 | End: 2025-04-01

## 2025-04-01 RX ADMIN — TETROFOSMIN 31.4 MILLICURIE: 1.38 INJECTION, POWDER, LYOPHILIZED, FOR SOLUTION INTRAVENOUS at 10:03

## 2025-04-01 RX ADMIN — SODIUM CHLORIDE, PRESERVATIVE FREE 10 ML: 5 INJECTION INTRAVENOUS at 10:04

## 2025-04-01 RX ADMIN — REGADENOSON 0.4 MG: 0.08 INJECTION, SOLUTION INTRAVENOUS at 10:02

## 2025-04-01 RX ADMIN — TETROFOSMIN 11.7 MILLICURIE: 1.38 INJECTION, POWDER, LYOPHILIZED, FOR SOLUTION INTRAVENOUS at 08:55

## 2025-04-01 RX ADMIN — SODIUM CHLORIDE, PRESERVATIVE FREE 10 ML: 5 INJECTION INTRAVENOUS at 10:02

## 2025-04-01 RX ADMIN — SODIUM CHLORIDE, PRESERVATIVE FREE 10 ML: 5 INJECTION INTRAVENOUS at 08:55

## 2025-04-08 ENCOUNTER — OFFICE VISIT (OUTPATIENT)
Age: 50
End: 2025-04-08
Payer: MEDICAID

## 2025-04-08 VITALS
SYSTOLIC BLOOD PRESSURE: 122 MMHG | OXYGEN SATURATION: 94 % | DIASTOLIC BLOOD PRESSURE: 88 MMHG | WEIGHT: 192.6 LBS | HEART RATE: 78 BPM | BODY MASS INDEX: 32.05 KG/M2

## 2025-04-08 DIAGNOSIS — R00.2 PALPITATIONS: ICD-10-CM

## 2025-04-08 DIAGNOSIS — R06.09 DOE (DYSPNEA ON EXERTION): ICD-10-CM

## 2025-04-08 DIAGNOSIS — M05.79 RHEUMATOID ARTHRITIS OF MULTIPLE SITES WITHOUT ORGAN OR SYSTEM INVOLVEMENT WITH POSITIVE RHEUMATOID FACTOR (HCC): ICD-10-CM

## 2025-04-08 DIAGNOSIS — R07.2 PRECORDIAL PAIN: ICD-10-CM

## 2025-04-08 DIAGNOSIS — R42 DIZZINESS: ICD-10-CM

## 2025-04-08 DIAGNOSIS — R07.9 CHEST PAIN, UNSPECIFIED TYPE: Primary | ICD-10-CM

## 2025-04-08 PROCEDURE — 99213 OFFICE O/P EST LOW 20 MIN: CPT | Performed by: INTERNAL MEDICINE

## 2025-04-08 ASSESSMENT — ENCOUNTER SYMPTOMS
CHEST TIGHTNESS: 0
EYES NEGATIVE: 1
SHORTNESS OF BREATH: 1
GASTROINTESTINAL NEGATIVE: 1
NAUSEA: 0
WHEEZING: 0
BLOOD IN STOOL: 0
STRIDOR: 0
COUGH: 0

## 2025-04-08 NOTE — PROGRESS NOTES
OFFICE VISIT         Patient: Radha Jhaveri  YOB: 1975  MRN: [id number]    Chief Complaint:  Chief Complaint   Patient presents with    Results     Echo & stress test       CV Data:  4/25 Echo EF 60-65%  4/25 SPECT negative EF 60%  1/25 EM SR. No malignant arrhythmia.  Frequent PVCs  OCC APCs     Subjective/HPI referred for CP HTN and palpitations.  ECG benign.  She has exertional no radiation. She has HERNANDEZ. She has RA and sees Rheum.  No bleed.     4/8/25 rare SOB no falls no bleed active no bleed.     EKG: SR 88    Former smoker  Lives w   Disabled  +FH    Past Medical History:   Diagnosis Date    Abnormal Pap smear of cervix     LAVH    Arthritis     Asthma     Breast disorder     Depression     Hepatitis C     Rheumatoid arteritis (HCC)        Past Surgical History:   Procedure Laterality Date    HYSTERECTOMY (CERVIX STATUS UNKNOWN)      LUNG SURGERY      TUBAL LIGATION         Family History   Problem Relation Age of Onset    Colon Cancer Maternal Grandmother     Breast Cancer Neg Hx        Social History     Socioeconomic History    Marital status:    Tobacco Use    Smoking status: Former     Current packs/day: 0.00     Types: Cigarettes     Quit date: 3/29/2022     Years since quitting: 3.0    Smokeless tobacco: Never   Vaping Use    Vaping status: Never Used   Substance and Sexual Activity    Alcohol use: No    Drug use: Not Currently     Types: Other-see comments     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    Sexual activity: Yes     Partners: Male     Social Drivers of Health     Financial Resource Strain: Patient Declined (2/19/2024)    Received from [facility]    Overall Financial Resource Strain (CARDIA)     Difficulty of Paying Living Expenses: Patient declined   Food Insecurity: Not on File (9/26/2024)    Received from [facility]    Food Insecurity     Food: 0   Transportation Needs: No Transportation Needs (12/23/2022)    Received from [facility]

## 2025-05-20 ENCOUNTER — OFFICE VISIT (OUTPATIENT)
Dept: GASTROENTEROLOGY | Age: 50
End: 2025-05-20
Payer: COMMERCIAL

## 2025-05-20 VITALS
WEIGHT: 184 LBS | BODY MASS INDEX: 30.62 KG/M2 | SYSTOLIC BLOOD PRESSURE: 110 MMHG | DIASTOLIC BLOOD PRESSURE: 60 MMHG | HEART RATE: 62 BPM | OXYGEN SATURATION: 98 %

## 2025-05-20 DIAGNOSIS — K76.0 HEPATIC STEATOSIS: Primary | ICD-10-CM

## 2025-05-20 PROCEDURE — G8427 DOCREV CUR MEDS BY ELIG CLIN: HCPCS | Performed by: NURSE PRACTITIONER

## 2025-05-20 PROCEDURE — G8417 CALC BMI ABV UP PARAM F/U: HCPCS | Performed by: NURSE PRACTITIONER

## 2025-05-20 PROCEDURE — 99213 OFFICE O/P EST LOW 20 MIN: CPT | Performed by: NURSE PRACTITIONER

## 2025-05-20 PROCEDURE — 1036F TOBACCO NON-USER: CPT | Performed by: NURSE PRACTITIONER

## 2025-05-20 RX ORDER — FAMOTIDINE 20 MG/1
20 TABLET, FILM COATED ORAL 2 TIMES DAILY
Qty: 60 TABLET | Refills: 3 | Status: SHIPPED | OUTPATIENT
Start: 2025-05-20

## 2025-05-20 ASSESSMENT — ENCOUNTER SYMPTOMS
BLOOD IN STOOL: 0
CHEST TIGHTNESS: 0
ABDOMINAL DISTENTION: 0
PHOTOPHOBIA: 0
COLOR CHANGE: 0
TROUBLE SWALLOWING: 0
NAUSEA: 0
CONSTIPATION: 0
EYE PAIN: 0
RECTAL PAIN: 0
VOICE CHANGE: 0
WHEEZING: 0
ANAL BLEEDING: 0
VOMITING: 0
EYE REDNESS: 0
SHORTNESS OF BREATH: 0
ABDOMINAL PAIN: 1
DIARRHEA: 1

## 2025-05-20 NOTE — PROGRESS NOTES
[facility] of Occupational Health - Occupational Stress Questionnaire     Feeling of Stress : Very much   Social Connections: Moderately Integrated (2025)    Received from [facility]    Social Connection and Isolation Panel [NHANES]     Frequency of Communication with Friends and Family: More than three times a week     Frequency of Social Gatherings with Friends and Family: More than three times a week     Attends Confucianist Services: 1 to 4 times per year     Active Member of Clubs or Organizations: No     Attends Club or Organization Meetings: Never     Marital Status:    Intimate Partner Violence: Not on file   Housing Stability: Not on File (10/30/2019)    Received from [facility]    Housing Stability     Housin     Family History   Problem Relation Age of Onset    Colon Cancer Maternal Grandmother     Breast Cancer Neg Hx      Allergies   Allergen Reactions    Isoniazid Itching and Swelling    Adalimumab Other (See Comments) and Hives     BLISTERS    Buspirone Other (See Comments)     Blisters  Blisters    Morphine Hives     hives  hives    Phentermine Other (See Comments)     Palpitations and chest pain    Quetiapine Swelling     Reports not being able to swallow following taking seroquel, but used to take regularly.  Reports not being able to swallow following taking seroquel, but used to take regularly.      Methotrexate Nausea And Vomiting         Review of Systems   Constitutional:  Negative for appetite change, chills, fever and unexpected weight change.   HENT:  Negative for nosebleeds, tinnitus, trouble swallowing and voice change.    Eyes:  Negative for photophobia, pain and redness.   Respiratory:  Negative for chest tightness, shortness of breath and wheezing.    Cardiovascular:  Negative for chest pain, palpitations and leg swelling.   Gastrointestinal:  Positive for abdominal pain and diarrhea. Negative for abdominal distention, anal bleeding,